# Patient Record
Sex: MALE | Race: WHITE | Employment: FULL TIME | URBAN - METROPOLITAN AREA
[De-identification: names, ages, dates, MRNs, and addresses within clinical notes are randomized per-mention and may not be internally consistent; named-entity substitution may affect disease eponyms.]

---

## 2017-08-02 ENCOUNTER — DOCUMENTATION ONLY (OUTPATIENT)
Dept: LAB | Facility: CLINIC | Age: 57
End: 2017-08-02

## 2017-08-02 DIAGNOSIS — Z80.8 FAMILY HISTORY OF THYROID CANCER: ICD-10-CM

## 2017-08-02 DIAGNOSIS — Z80.42 FAMILY HX OF PROSTATE CANCER: ICD-10-CM

## 2017-08-02 DIAGNOSIS — Z00.00 ROUTINE GENERAL MEDICAL EXAMINATION AT A HEALTH CARE FACILITY: ICD-10-CM

## 2017-08-02 DIAGNOSIS — E03.9 ACQUIRED HYPOTHYROIDISM: ICD-10-CM

## 2017-08-02 DIAGNOSIS — Z13.6 CARDIOVASCULAR SCREENING; LDL GOAL LESS THAN 160: Primary | ICD-10-CM

## 2017-08-02 NOTE — PROGRESS NOTES
Pt is coming for PV physical lab on 8/9/17. I have pended future orders. Please review, associate diagnosis, and sign pended order.     Thanks, Lab

## 2017-08-09 DIAGNOSIS — Z13.6 CARDIOVASCULAR SCREENING; LDL GOAL LESS THAN 160: ICD-10-CM

## 2017-08-09 DIAGNOSIS — Z80.8 FAMILY HISTORY OF THYROID CANCER: ICD-10-CM

## 2017-08-09 DIAGNOSIS — E03.9 ACQUIRED HYPOTHYROIDISM: ICD-10-CM

## 2017-08-09 DIAGNOSIS — Z00.00 ROUTINE GENERAL MEDICAL EXAMINATION AT A HEALTH CARE FACILITY: ICD-10-CM

## 2017-08-09 DIAGNOSIS — Z80.42 FAMILY HX OF PROSTATE CANCER: ICD-10-CM

## 2017-08-09 LAB
ANION GAP SERPL CALCULATED.3IONS-SCNC: 4 MMOL/L (ref 3–14)
BUN SERPL-MCNC: 18 MG/DL (ref 7–30)
CALCIUM SERPL-MCNC: 8.6 MG/DL (ref 8.5–10.1)
CHLORIDE SERPL-SCNC: 105 MMOL/L (ref 94–109)
CHOLEST SERPL-MCNC: 180 MG/DL
CO2 SERPL-SCNC: 31 MMOL/L (ref 20–32)
CREAT SERPL-MCNC: 1.22 MG/DL (ref 0.66–1.25)
GFR SERPL CREATININE-BSD FRML MDRD: 61 ML/MIN/1.7M2
GLUCOSE SERPL-MCNC: 90 MG/DL (ref 70–99)
HDLC SERPL-MCNC: 66 MG/DL
LDLC SERPL CALC-MCNC: 100 MG/DL
NONHDLC SERPL-MCNC: 114 MG/DL
POTASSIUM SERPL-SCNC: 3.8 MMOL/L (ref 3.4–5.3)
PSA SERPL-ACNC: 0.5 UG/L (ref 0–4)
SODIUM SERPL-SCNC: 140 MMOL/L (ref 133–144)
TRIGL SERPL-MCNC: 69 MG/DL
TSH SERPL DL<=0.005 MIU/L-ACNC: 6.6 MU/L (ref 0.4–4)

## 2017-08-09 PROCEDURE — 84443 ASSAY THYROID STIM HORMONE: CPT | Performed by: INTERNAL MEDICINE

## 2017-08-09 PROCEDURE — 80061 LIPID PANEL: CPT | Performed by: INTERNAL MEDICINE

## 2017-08-09 PROCEDURE — 36415 COLL VENOUS BLD VENIPUNCTURE: CPT | Performed by: INTERNAL MEDICINE

## 2017-08-09 PROCEDURE — 80048 BASIC METABOLIC PNL TOTAL CA: CPT | Performed by: INTERNAL MEDICINE

## 2017-08-09 PROCEDURE — G0103 PSA SCREENING: HCPCS | Performed by: INTERNAL MEDICINE

## 2017-08-17 ENCOUNTER — OFFICE VISIT (OUTPATIENT)
Dept: FAMILY MEDICINE | Facility: CLINIC | Age: 57
End: 2017-08-17
Payer: COMMERCIAL

## 2017-08-17 VITALS
DIASTOLIC BLOOD PRESSURE: 78 MMHG | HEART RATE: 63 BPM | BODY MASS INDEX: 21.39 KG/M2 | HEIGHT: 75 IN | SYSTOLIC BLOOD PRESSURE: 116 MMHG | TEMPERATURE: 97.2 F | WEIGHT: 172 LBS | OXYGEN SATURATION: 99 %

## 2017-08-17 DIAGNOSIS — Z00.00 ROUTINE GENERAL MEDICAL EXAMINATION AT A HEALTH CARE FACILITY: Primary | ICD-10-CM

## 2017-08-17 DIAGNOSIS — Z80.8 FAMILY HISTORY OF THYROID CANCER: ICD-10-CM

## 2017-08-17 DIAGNOSIS — G44.209 TENSION HEADACHE: ICD-10-CM

## 2017-08-17 DIAGNOSIS — L65.9 ALOPECIA: ICD-10-CM

## 2017-08-17 DIAGNOSIS — Z80.42 FAMILY HX OF PROSTATE CANCER: ICD-10-CM

## 2017-08-17 DIAGNOSIS — E03.9 ACQUIRED HYPOTHYROIDISM: ICD-10-CM

## 2017-08-17 DIAGNOSIS — Z23 NEED FOR PROPHYLACTIC VACCINATION WITH TETANUS-DIPHTHERIA (TD): ICD-10-CM

## 2017-08-17 PROCEDURE — 99396 PREV VISIT EST AGE 40-64: CPT | Mod: 25 | Performed by: INTERNAL MEDICINE

## 2017-08-17 PROCEDURE — 90471 IMMUNIZATION ADMIN: CPT | Performed by: INTERNAL MEDICINE

## 2017-08-17 PROCEDURE — 90714 TD VACC NO PRESV 7 YRS+ IM: CPT | Performed by: INTERNAL MEDICINE

## 2017-08-17 RX ORDER — FINASTERIDE 1 MG/1
1 TABLET, FILM COATED ORAL DAILY
Qty: 100 TABLET | Refills: 3 | Status: SHIPPED | OUTPATIENT
Start: 2017-08-17 | End: 2018-08-09

## 2017-08-17 RX ORDER — AMITRIPTYLINE HYDROCHLORIDE 10 MG/1
10 TABLET ORAL
Qty: 90 TABLET | Refills: 0 | Status: SHIPPED | OUTPATIENT
Start: 2017-08-17 | End: 2018-02-01

## 2017-08-17 NOTE — PROGRESS NOTES
SUBJECTIVE:   CC: Chano Vaz is an 56 year old male who presents for preventative health visit.     Healthy Habits: Answers for HPI/ROS submitted by the patient on 8/15/2017   Annual Exam:  Getting at least 3 servings of Calcium per day:: Yes  Bi-annual eye exam:: Yes  Dental care twice a year:: Yes  Sleep apnea or symptoms of sleep apnea:: None  Diet:: Regular (no restrictions)  Frequency of exercise:: 4-5 days/week  Taking medications regularly:: Yes  Medication side effects:: None  Additional concerns today:: No  PHQ-2 Score: 0  Duration of exercise:: 45-60 minutes      Patient is very healthy and has no issues today      Today's PHQ-2 Score:   PHQ-2 ( 1999 [employer]) 8/15/2017 8/12/2016   Q1: Little interest or pleasure in doing things 0 0   Q2: Feeling down, depressed or hopeless 0 0   PHQ-2 Score 0 0   Q1: Little interest or pleasure in doing things Not at all -   Q2: Feeling down, depressed or hopeless Not at all -   PHQ-2 Score 0 -         Abuse: Current or Past(Physical, Sexual or Emotional)- No  Do you feel safe in your environment - Yes  Social History   Substance Use Topics     Smoking status: Never Smoker     Smokeless tobacco: Never Used     Alcohol use 1.5 - 2.5 oz/week     3 - 5 Standard drinks or equivalent per week      Comment: social, twice a week     The patient does not drink >3 drinks per day nor >7 drinks per week.    Last PSA:   PSA   Date Value Ref Range Status   08/09/2017 0.50 0 - 4 ug/L Final     Comment:     Assay Method:  Chemiluminescence using Siemens Vista analyzer       Reviewed orders with patient. Reviewed health maintenance and updated orders accordingly - Yes  Patient Active Problem List   Diagnosis     Internal hemorrhoids with other complication     CARDIOVASCULAR SCREENING; LDL GOAL LESS THAN 160     Left hip pain     Family hx of prostate cancer     Family history of thyroid cancer     Knee pain     Hypothyroidism     Advanced directives, counseling/discussion      Past Surgical History:   Procedure Laterality Date     COLONOSCOPY  2007       Social History   Substance Use Topics     Smoking status: Never Smoker     Smokeless tobacco: Never Used     Alcohol use 1.5 - 2.5 oz/week     3 - 5 Standard drinks or equivalent per week      Comment: social, twice a week     Family History   Problem Relation Age of Onset     Prostate Cancer Maternal Grandfather      Hypertension Father      Arthritis Mother      SLE     Thyroid Disease Father      CANCER Daughter 18     thyroid         Current Outpatient Prescriptions   Medication Sig Dispense Refill     finasteride (PROPECIA) 1 MG tablet Take 1 tablet (1 mg) by mouth daily 100 tablet 3     amitriptyline (ELAVIL) 10 MG tablet Take 1 tablet (10 mg) by mouth nightly as needed for sleep 90 tablet 0     amitriptyline (ELAVIL) 10 MG tablet 10 mg daily (Patient taking differently: Take 5 mg by mouth At Bedtime 10 mg daily) 90 tablet 3     Ascorbic Acid (VITAMIN C PO) Take 1 tablet by mouth daily        FISH  MG OR CAPS 2 caps a day       ASPIRIN 81 MG OR TABS ONE DAILY 100 3     Allergies   Allergen Reactions     No Known Allergies      Orders Only on 08/09/2017   Component Date Value Ref Range Status     TSH 08/09/2017 6.60* 0.40 - 4.00 mU/L Final     Sodium 08/09/2017 140  133 - 144 mmol/L Final     Potassium 08/09/2017 3.8  3.4 - 5.3 mmol/L Final     Chloride 08/09/2017 105  94 - 109 mmol/L Final     Carbon Dioxide 08/09/2017 31  20 - 32 mmol/L Final     Anion Gap 08/09/2017 4  3 - 14 mmol/L Final     Glucose 08/09/2017 90  70 - 99 mg/dL Final     Urea Nitrogen 08/09/2017 18  7 - 30 mg/dL Final     Creatinine 08/09/2017 1.22  0.66 - 1.25 mg/dL Final     GFR Estimate 08/09/2017 61  >60 mL/min/1.7m2 Final    Non  GFR Calc     GFR Estimate If Black 08/09/2017 74  >60 mL/min/1.7m2 Final    African American GFR Calc     Calcium 08/09/2017 8.6  8.5 - 10.1 mg/dL Final     PSA 08/09/2017 0.50  0 - 4 ug/L Final    Assay  "Method:  Chemiluminescence using Siemens Vista analyzer     Cholesterol 08/09/2017 180  <200 mg/dL Final     Triglycerides 08/09/2017 69  <150 mg/dL Final     HDL Cholesterol 08/09/2017 66  >39 mg/dL Final     LDL Cholesterol Calculated 08/09/2017 100* <100 mg/dL Final    Comment: Above desirable:  100-129 mg/dl   Borderline High:  130-159 mg/dL   High:             160-189 mg/dL   Very high:       >189 mg/dl       Non HDL Cholesterol 08/09/2017 114  <130 mg/dL Final       Reviewed and updated as needed this visit by clinical staff  Tobacco  Allergies  Meds  Soc Hx        Reviewed and updated as needed this visit by Provider  Meds            ROS:  10 point ROS of systems including Constitutional, Eyes, Respiratory, Cardiovascular, Gastroenterology, Genitourinary, Integumentary, Muscularskeletal, Psychiatric were all negative except for pertinent positives noted in my HPI.    OBJECTIVE:   /78 (BP Location: Left arm, Patient Position: Chair, Cuff Size: Adult Regular)  Pulse 63  Temp 97.2  F (36.2  C) (Tympanic)  Ht 6' 3\" (1.905 m)  Wt 172 lb (78 kg)  SpO2 99%  BMI 21.5 kg/m2  EXAM:  GENERAL: healthy, alert and no distress  EYES: Eyes grossly normal to inspection, PERRL and conjunctivae and sclerae normal  HENT: ear canals and TM's normal, nose and mouth without ulcers or lesions  NECK: no adenopathy, no asymmetry, masses, or scars and thyroid normal to palpation  RESP: lungs clear to auscultation - no rales, rhonchi or wheezes  CV: regular rate and rhythm, normal S1 S2, no S3 or S4, no murmur, click or rub, no peripheral edema and peripheral pulses strong  ABDOMEN: soft, nontender, no hepatosplenomegaly, no masses and bowel sounds normal   (male): normal male genitalia without lesions or urethral discharge, no hernia  RECTAL: normal sphincter tone, no rectal masses, prostate normal size, smooth, nontender without nodules or masses  MS: no gross musculoskeletal defects noted, no edema  SKIN: no " suspicious lesions or rashes  NEURO: Normal strength and tone, mentation intact and speech normal  PSYCH: mentation appears normal, affect normal/bright  Orders Only on 08/09/2017   Component Date Value Ref Range Status     TSH 08/09/2017 6.60* 0.40 - 4.00 mU/L Final     Sodium 08/09/2017 140  133 - 144 mmol/L Final     Potassium 08/09/2017 3.8  3.4 - 5.3 mmol/L Final     Chloride 08/09/2017 105  94 - 109 mmol/L Final     Carbon Dioxide 08/09/2017 31  20 - 32 mmol/L Final     Anion Gap 08/09/2017 4  3 - 14 mmol/L Final     Glucose 08/09/2017 90  70 - 99 mg/dL Final     Urea Nitrogen 08/09/2017 18  7 - 30 mg/dL Final     Creatinine 08/09/2017 1.22  0.66 - 1.25 mg/dL Final     GFR Estimate 08/09/2017 61  >60 mL/min/1.7m2 Final    Non  GFR Calc     GFR Estimate If Black 08/09/2017 74  >60 mL/min/1.7m2 Final    African American GFR Calc     Calcium 08/09/2017 8.6  8.5 - 10.1 mg/dL Final     PSA 08/09/2017 0.50  0 - 4 ug/L Final    Assay Method:  Chemiluminescence using Siemens Vista analyzer     Cholesterol 08/09/2017 180  <200 mg/dL Final     Triglycerides 08/09/2017 69  <150 mg/dL Final     HDL Cholesterol 08/09/2017 66  >39 mg/dL Final     LDL Cholesterol Calculated 08/09/2017 100* <100 mg/dL Final    Comment: Above desirable:  100-129 mg/dl   Borderline High:  130-159 mg/dL   High:             160-189 mg/dL   Very high:       >189 mg/dl       Non HDL Cholesterol 08/09/2017 114  <130 mg/dL Final       ASSESSMENT/PLAN:   1. Routine general medical examination at a health care facility  Dt today  uptodate on immunization otherwise      2. Acquired hypothyroidism  Subclinical still  - **TSH with free T4 reflex FUTURE 2mo; Future    3. Tension headache  Very small dose   - amitriptyline (ELAVIL) 10 MG tablet; Take 1 tablet (10 mg) by mouth nightly as needed for sleep  Dispense: 90 tablet; Refill: 0    4. Family hx of prostate cancer  No symptoms     5. Family history of thyroid cancer  As above  Normal  "exam    6. Alopecia    - finasteride (PROPECIA) 1 MG tablet; Take 1 tablet (1 mg) by mouth daily  Dispense: 100 tablet; Refill: 3    7. Need for prophylactic vaccination with tetanus-diphtheria (TD)    - TD PRSERV FREE >=7 YRS ADS IM [39346]  - 1st  Administration  [24242]    COUNSELING:  Reviewed preventive health counseling, as reflected in patient instructions         reports that he has never smoked. He has never used smokeless tobacco.      Estimated body mass index is 21.5 kg/(m^2) as calculated from the following:    Height as of this encounter: 6' 3\" (1.905 m).    Weight as of this encounter: 172 lb (78 kg).         Counseling Resources:  ATP IV Guidelines  Pooled Cohorts Equation Calculator  FRAX Risk Assessment  ICSI Preventive Guidelines  Dietary Guidelines for Americans, 2010  USDA's MyPlate  ASA Prophylaxis  Lung CA Screening    Zenobia Ramirez MD  [facility]  "

## 2017-08-17 NOTE — NURSING NOTE
"Chief Complaint   Patient presents with     Physical       Initial /78 (BP Location: Left arm, Patient Position: Chair, Cuff Size: Adult Regular)  Pulse 63  Temp 97.2  F (36.2  C) (Tympanic)  Ht 6' 3\" (1.905 m)  Wt 172 lb (78 kg)  SpO2 99%  BMI 21.5 kg/m2 Estimated body mass index is 21.5 kg/(m^2) as calculated from the following:    Height as of this encounter: 6' 3\" (1.905 m).    Weight as of this encounter: 172 lb (78 kg).  Medication Reconciliation: complete  "

## 2017-08-17 NOTE — MR AVS SNAPSHOT
After Visit Summary   8/17/2017    Chano Vaz    MRN: [id number]           Patient Information     Date Of Birth          1960        Visit Information        Provider Department      8/17/2017 8:30 AM Zenobia Ramirez MD [facility]        Today's Diagnoses     Routine general medical examination at a health care facility    -  1    Acquired hypothyroidism        Tension headache        Family hx of prostate cancer        Family history of thyroid cancer        Alopecia        Need for prophylactic vaccination with tetanus-diphtheria (TD)          Care Instructions      Preventive Health Recommendations  Male Ages 50 - 64    Yearly exam:             See your health care provider every year in order to  o   Review health changes.   o   Discuss preventive care.    o   Review your medicines if your doctor has prescribed any.     Have a cholesterol test every 5 years, or more frequently if you are at risk for high cholesterol/heart disease.     Have a diabetes test (fasting glucose) every three years. If you are at risk for diabetes, you should have this test more often.     Have a colonoscopy at age 50, or have a yearly FIT test (stool test). These exams will check for colon cancer.      Talk with your health care provider about whether or not a prostate cancer screening test (PSA) is right for you.    You should be tested each year for STDs (sexually transmitted diseases), if you re at risk.     Shots: Get a flu shot each year. Get a tetanus shot every 10 years.     Nutrition:    Eat at least 5 servings of fruits and vegetables daily.     Eat whole-grain bread, whole-wheat pasta and brown rice instead of white grains and rice.     Talk to your provider about Calcium and Vitamin D.     Lifestyle    Exercise for at least 150 minutes a week (30 minutes a day, 5 days a week). This will help you control your weight and prevent disease.     Limit alcohol to one drink per day.     No  "smoking.     Wear sunscreen to prevent skin cancer.     See your dentist every six months for an exam and cleaning.     See your eye doctor every 1 to 2 years.    Turmeric/Curcumin 500 mg daily            Follow-ups after your visit        Future tests that were ordered for you today     Open Future Orders        Priority Expected Expires Ordered    **TSH with free T4 reflex FUTURE 2mo Routine 10/16/2017 12/15/2017 8/17/2017            Who to contact     If you have questions or need follow up information about today's clinic visit or your schedule please contact [facility] directly at [phone].  Normal or non-critical lab and imaging results will be communicated to you by [employer]Medics[employer], letter or phone within 4 business days after the clinic has received the results. If you do not hear from us within 7 days, please contact the clinic through [employer]citet or phone. If you have a critical or abnormal lab result, we will notify you by phone as soon as possible.  Submit refill requests through [employer]-Semble or call your pharmacy and they will forward the refill request to us. Please allow 3 business days for your refill to be completed.          Additional Information About Your Visit        MyChart Information     [employer] gives you secure access to your electronic health record. If you see a primary care provider, you can also send messages to your care team and make appointments. If you have questions, please call your primary care clinic.  If you do not have a primary care provider, please call [phone] and they will assist you.        Care EveryWhere ID     This is your Care EveryWhere ID. This could be used by other organizations to access your [facility] medical records  [id number]        Your Vitals Were     Pulse Temperature Height Pulse Oximetry BMI (Body Mass Index)       63 97.2  F (36.2  C) (Tympanic) 6' 3\" (1.905 m) 99% 21.5 kg/m2        Blood Pressure from Last 3 Encounters:   08/17/17 116/78 "   08/12/16 129/87   07/20/15 119/77    Weight from Last 3 Encounters:   08/17/17 172 lb (78 kg)   08/12/16 170 lb 4 oz (77.2 kg)   07/20/15 172 lb (78 kg)              We Performed the Following     1st  Administration  [29567]     TD PRSERV FREE >=7 YRS ADS IM [[id number]]          Today's Medication Changes          These changes are accurate as of: 8/17/17  8:46 AM.  If you have any questions, ask your nurse or doctor.               These medicines have changed or have updated prescriptions.        Dose/Directions    * amitriptyline 10 MG tablet   Commonly known as:  ELAVIL   This may have changed:    - how much to take  - how to take this  - when to take this  - additional instructions   Used for:  Chronic pain of right knee   Changed by:  Zenobia Ramirez MD        10 mg daily   Quantity:  90 tablet   Refills:  3       * amitriptyline 10 MG tablet   Commonly known as:  ELAVIL   This may have changed:  You were already taking a medication with the same name, and this prescription was added. Make sure you understand how and when to take each.   Used for:  Tension headache   Changed by:  Zenobia Ramirez MD        Dose:  10 mg   Take 1 tablet (10 mg) by mouth nightly as needed for sleep   Quantity:  90 tablet   Refills:  0       finasteride 1 MG tablet   Commonly known as:  PROPECIA   This may have changed:  See the new instructions.   Used for:  Alopecia   Changed by:  Zenobia Ramirez MD        Dose:  1 tablet   Take 1 tablet (1 mg) by mouth daily   Quantity:  100 tablet   Refills:  3       * Notice:  This list has 2 medication(s) that are the same as other medications prescribed for you. Read the directions carefully, and ask your doctor or other care provider to review them with you.         Where to get your medicines      These medications were sent to [facility] [address] [facility] - [place] - [address]  [address] [address]     Phone:  [phone]     amitriptyline 10 MG tablet         Some of these will  need a paper prescription and others can be bought over the counter.  Ask your nurse if you have questions.     Bring a paper prescription for each of these medications     finasteride 1 MG tablet                Primary Care Provider Office Phone # Fax #    Zenobia Ramirez [phone] [address]  [facility] [address]        Equal Access to Services     [facility]: Hadii aad ku hadasho Soomaali, waaxda luqadaha, qaybta kaalmada adeegyada, waxay idiin hayaan adeeg kharash la'aan ah. So [facility] [phone].    ATENCIÓN: Si habla español, tiene a miranda disposición servicios gratuitos de asistencia lingüística. Llame al [phone].    We comply with applicable federal civil rights laws and [place] laws. We do not discriminate on the basis of race, color, national origin, age, disability sex, sexual orientation or gender identity.            Thank you!     Thank you for choosing [facility]  for your care. Our goal is always to provide you with excellent care. Hearing back from our patients is one way we can continue to improve our services. Please take a few minutes to complete the written survey that you may receive in the mail after your visit with us. Thank you!             Your Updated Medication List - Protect others around you: Learn how to safely use, store and throw away your medicines at www.disposemymeds.org.          This list is accurate as of: 8/17/17  8:46 AM.  Always use your most recent med list.                   Brand Name Dispense Instructions for use Diagnosis    * amitriptyline 10 MG tablet    ELAVIL    90 tablet    10 mg daily    Chronic pain of right knee       * amitriptyline 10 MG tablet    ELAVIL    90 tablet    Take 1 tablet (10 mg) by mouth nightly as needed for sleep    Tension headache       aspirin 81 MG tablet     100    ONE DAILY    Routine general medical examination at a health care facility       finasteride 1 MG tablet    PROPECIA    100  tablet    Take 1 tablet (1 mg) by mouth daily    Alopecia       Fish Oil 500 MG Caps      2 caps a day        VITAMIN C PO      Take 1 tablet by mouth daily        * Notice:  This list has 2 medication(s) that are the same as other medications prescribed for you. Read the directions carefully, and ask your doctor or other care provider to review them with you.

## 2017-08-17 NOTE — PATIENT INSTRUCTIONS
Preventive Health Recommendations  Male Ages 50 - 64    Yearly exam:             See your health care provider every year in order to  o   Review health changes.   o   Discuss preventive care.    o   Review your medicines if your doctor has prescribed any.     Have a cholesterol test every 5 years, or more frequently if you are at risk for high cholesterol/heart disease.     Have a diabetes test (fasting glucose) every three years. If you are at risk for diabetes, you should have this test more often.     Have a colonoscopy at age 50, or have a yearly FIT test (stool test). These exams will check for colon cancer.      Talk with your health care provider about whether or not a prostate cancer screening test (PSA) is right for you.    You should be tested each year for STDs (sexually transmitted diseases), if you re at risk.     Shots: Get a flu shot each year. Get a tetanus shot every 10 years.     Nutrition:    Eat at least 5 servings of fruits and vegetables daily.     Eat whole-grain bread, whole-wheat pasta and brown rice instead of white grains and rice.     Talk to your provider about Calcium and Vitamin D.     Lifestyle    Exercise for at least 150 minutes a week (30 minutes a day, 5 days a week). This will help you control your weight and prevent disease.     Limit alcohol to one drink per day.     No smoking.     Wear sunscreen to prevent skin cancer.     See your dentist every six months for an exam and cleaning.     See your eye doctor every 1 to 2 years.    Turmeric/Curcumin 500 mg daily

## 2017-08-17 NOTE — NURSING NOTE
Screening Questionnaire for Adult Immunization    Are you sick today?   No   Do you have allergies to medications, food, a vaccine component or latex?   No   Have you ever had a serious reaction after receiving a vaccination?   No   Do you have a long-term health problem with heart disease, lung disease, asthma, kidney disease, metabolic disease (e.g. diabetes), anemia, or other blood disorder?   No   Do you have cancer, leukemia, HIV/AIDS, or any other immune system problem?   No   In the past 3 months, have you taken medications that affect  your immune system, such as prednisone, other steroids, or anticancer drugs; drugs for the treatment of rheumatoid arthritis, Crohn s disease, or psoriasis; or have you had radiation treatments?   No   Have you had a seizure, or a brain or other nervous system problem?   No   During the past year, have you received a transfusion of blood or blood     products, or been given immune (gamma) globulin or antiviral drug?   No   For women: Are you pregnant or is there a chance you could become        pregnant during the next month?   No   Have you received any vaccinations in the past 4 weeks?   No     Immunization questionnaire answers were all negative.        Per orders of Dr. Ramirez, injection of TD given by Margo Estevez. Patient instructed to remain in clinic for 15 minutes afterwards, and to report any adverse reaction to me immediately.     Prior to injection verified patient identity using patient's name and date of birth.  Screening performed by Margo Estevez on 8/17/2017 at 9:13 AM.  [facility]

## 2017-10-24 DIAGNOSIS — E03.9 ACQUIRED HYPOTHYROIDISM: ICD-10-CM

## 2017-10-24 LAB
T4 FREE SERPL-MCNC: 1.08 NG/DL (ref 0.76–1.46)
TSH SERPL DL<=0.005 MIU/L-ACNC: 4.1 MU/L (ref 0.4–4)

## 2017-10-24 PROCEDURE — 84439 ASSAY OF FREE THYROXINE: CPT | Performed by: INTERNAL MEDICINE

## 2017-10-24 PROCEDURE — 84443 ASSAY THYROID STIM HORMONE: CPT | Performed by: INTERNAL MEDICINE

## 2017-10-24 PROCEDURE — 36415 COLL VENOUS BLD VENIPUNCTURE: CPT | Performed by: INTERNAL MEDICINE

## 2018-02-01 DIAGNOSIS — G44.209 TENSION HEADACHE: ICD-10-CM

## 2018-02-01 NOTE — TELEPHONE ENCOUNTER
"amitriptyline (ELAVIL) 10 MG tablet 90 tablet 0 8/17/2017         Last Written Prescription Date:  08/17/2017  Last Fill Quantity: 90,  # refills: 0   Last Office Visit with [facility] provider:  08/17/2017   Future Office Visit:   unknown  Requested Prescriptions   Pending Prescriptions Disp Refills     amitriptyline (ELAVIL) 10 MG tablet [Pharmacy Med Name: AMITRIPTYLINE HCL 10 MG TAB] 90 tablet 1     Sig: TAKE 1 TABLET BY MOUTH EVERY DAY    Tricyclic Antidepressants Protocol Passed    2/1/2018  1:39 PM       Passed - Blood pressure under 140/90    BP Readings from Last 3 Encounters:   08/17/17 116/78   08/12/16 129/87   07/20/15 119/77                Passed - Recent (12 mo) or future (30 d) visit with authorizing provider's specialty     Patient had office visit in the last year or has a visit in the next 30 days with authorizing provider.  See \"Patient Info\" tab in inbasket, or \"Choose Columns\" in Meds & Orders section of the refill encounter.            Passed - Patient is age 18 or older          "

## 2018-02-02 RX ORDER — AMITRIPTYLINE HYDROCHLORIDE 10 MG/1
TABLET ORAL
Qty: 90 TABLET | Refills: 1 | Status: SHIPPED | OUTPATIENT
Start: 2018-02-02 | End: 2018-07-29

## 2018-02-02 NOTE — TELEPHONE ENCOUNTER
Prescription approved per [facility] Refill Protocol.  Shahida Millard RN

## 2018-05-15 ENCOUNTER — MYC MEDICAL ADVICE (OUTPATIENT)
Dept: FAMILY MEDICINE | Facility: CLINIC | Age: 58
End: 2018-05-15

## 2018-05-15 DIAGNOSIS — L23.7 CONTACT DERMATITIS DUE TO POISON IVY: Primary | ICD-10-CM

## 2018-05-15 RX ORDER — TRIAMCINOLONE ACETONIDE 1 MG/G
CREAM TOPICAL
Qty: 30 G | Refills: 1 | Status: SHIPPED | OUTPATIENT
Start: 2018-05-15 | End: 2018-05-15

## 2018-05-15 RX ORDER — TRIAMCINOLONE ACETONIDE 1 MG/G
CREAM TOPICAL
Qty: 30 G | Refills: 1 | Status: SHIPPED | OUTPATIENT
Start: 2018-05-15 | End: 2018-08-09

## 2018-05-15 NOTE — TELEPHONE ENCOUNTER
Patient called regarding this prescription.  He is traveling, and needs the prescription sent to:    [facility]  [address]    Patient can be reached at:  [phone]  Ok to leave a detailed message at this number.

## 2018-05-16 ENCOUNTER — TELEPHONE (OUTPATIENT)
Dept: FAMILY MEDICINE | Facility: CLINIC | Age: 58
End: 2018-05-16

## 2018-05-16 DIAGNOSIS — Z13.6 CARDIOVASCULAR SCREENING; LDL GOAL LESS THAN 100: ICD-10-CM

## 2018-05-16 DIAGNOSIS — E03.9 ACQUIRED HYPOTHYROIDISM: ICD-10-CM

## 2018-05-16 DIAGNOSIS — M25.552 LEFT HIP PAIN: ICD-10-CM

## 2018-05-16 DIAGNOSIS — Z80.8 FAMILY HISTORY OF THYROID CANCER: Primary | ICD-10-CM

## 2018-05-16 DIAGNOSIS — Z80.42 FAMILY HX OF PROSTATE CANCER: ICD-10-CM

## 2018-05-16 NOTE — TELEPHONE ENCOUNTER
To PCP:     Pt would like fasting physical labs prior to appt. Can these be ordered as future?     Thank you,   Michelle JNAG RN

## 2018-05-16 NOTE — TELEPHONE ENCOUNTER
Reason for Call: Request for an order or referral:    Order or referral being requested: Fasting Labs     Date needed: before my next appointment    Has the patient been seen by the PCP for this problem? YES    Additional comments: Pt has px scheduled for 8/9/18, would like to do blood work before to discuss at appointment time. Please order these. And call patient to let him know so he can schedule     Phone number Patient can be reached at:  Home number on file [phone] (home)    Best Time:  Anytime     Can we leave a detailed message on this number?  YES    Call taken on 5/16/2018 at 4:20 PM by Ayana Vinson

## 2018-05-17 PROBLEM — Z13.6 CARDIOVASCULAR SCREENING; LDL GOAL LESS THAN 100: Status: ACTIVE | Noted: 2018-05-17

## 2018-07-29 DIAGNOSIS — G44.209 TENSION HEADACHE: ICD-10-CM

## 2018-07-30 NOTE — TELEPHONE ENCOUNTER
"Last Written Prescription Date:  2/2/18  Last Fill Quantity: 90,  # refills: 1   Last office visit: 8/17/2017 with prescribing provider:     Future Office Visit:   Next 5 appointments (look out 90 days)     Aug 09, 2018  9:30 AM CDT   PHYSICAL with Zenobia Ramirez MD   [facility] ([facility])    [address] [facility] [id number] [phone]                 Requested Prescriptions   Pending Prescriptions Disp Refills     amitriptyline (ELAVIL) 10 MG tablet [Pharmacy Med Name: AMITRIPTYLINE HCL 10 MG TAB] 90 tablet 1     Sig: TAKE 1 TABLET BY MOUTH EVERY DAY    Tricyclic Agents ( Annual appt and no PHQ9) Passed    7/29/2018  1:29 AM       Passed - Blood Pressure under 140/90 in past 12 mos    BP Readings from Last 3 Encounters:   08/17/17 116/78   08/12/16 129/87   07/20/15 119/77                Passed - Recent (12 mo) or future (30 days) visit within authorizing provider's specialty    Patient had office visit in the last 12 months or has a visit in the next 30 days with authorizing provider or within the authorizing provider's specialty.  See \"Patient Info\" tab in inbasket, or \"Choose Columns\" in Meds & Orders section of the refill encounter.           Passed - Patient is age 18 or older          "

## 2018-07-31 RX ORDER — AMITRIPTYLINE HYDROCHLORIDE 10 MG/1
TABLET ORAL
Qty: 90 TABLET | Refills: 0 | Status: SHIPPED | OUTPATIENT
Start: 2018-07-31 | End: 2018-08-09

## 2018-08-07 DIAGNOSIS — Z13.6 CARDIOVASCULAR SCREENING; LDL GOAL LESS THAN 100: ICD-10-CM

## 2018-08-07 DIAGNOSIS — E03.9 ACQUIRED HYPOTHYROIDISM: ICD-10-CM

## 2018-08-07 DIAGNOSIS — M25.552 LEFT HIP PAIN: ICD-10-CM

## 2018-08-07 DIAGNOSIS — Z80.8 FAMILY HISTORY OF THYROID CANCER: ICD-10-CM

## 2018-08-07 DIAGNOSIS — Z80.42 FAMILY HX OF PROSTATE CANCER: ICD-10-CM

## 2018-08-07 LAB
ALBUMIN SERPL-MCNC: 3.7 G/DL (ref 3.4–5)
ALP SERPL-CCNC: 92 U/L (ref 40–150)
ALT SERPL W P-5'-P-CCNC: 23 U/L (ref 0–70)
ANION GAP SERPL CALCULATED.3IONS-SCNC: 7 MMOL/L (ref 3–14)
AST SERPL W P-5'-P-CCNC: 17 U/L (ref 0–45)
BILIRUB SERPL-MCNC: 2.1 MG/DL (ref 0.2–1.3)
BUN SERPL-MCNC: 18 MG/DL (ref 7–30)
CALCIUM SERPL-MCNC: 8.4 MG/DL (ref 8.5–10.1)
CHLORIDE SERPL-SCNC: 104 MMOL/L (ref 94–109)
CHOLEST SERPL-MCNC: 223 MG/DL
CO2 SERPL-SCNC: 29 MMOL/L (ref 20–32)
CREAT SERPL-MCNC: 1.03 MG/DL (ref 0.66–1.25)
GFR SERPL CREATININE-BSD FRML MDRD: 74 ML/MIN/1.7M2
GLUCOSE SERPL-MCNC: 88 MG/DL (ref 70–99)
HDLC SERPL-MCNC: 57 MG/DL
LDLC SERPL CALC-MCNC: 140 MG/DL
NONHDLC SERPL-MCNC: 166 MG/DL
POTASSIUM SERPL-SCNC: 4 MMOL/L (ref 3.4–5.3)
PROT SERPL-MCNC: 7.4 G/DL (ref 6.8–8.8)
PSA SERPL-ACNC: 0.36 UG/L (ref 0–4)
SODIUM SERPL-SCNC: 140 MMOL/L (ref 133–144)
T4 FREE SERPL-MCNC: 0.96 NG/DL (ref 0.76–1.46)
TRIGL SERPL-MCNC: 128 MG/DL
TSH SERPL DL<=0.005 MIU/L-ACNC: 4.46 MU/L (ref 0.4–4)

## 2018-08-07 PROCEDURE — 80061 LIPID PANEL: CPT | Performed by: INTERNAL MEDICINE

## 2018-08-07 PROCEDURE — G0103 PSA SCREENING: HCPCS | Performed by: INTERNAL MEDICINE

## 2018-08-07 PROCEDURE — 36415 COLL VENOUS BLD VENIPUNCTURE: CPT | Performed by: INTERNAL MEDICINE

## 2018-08-07 PROCEDURE — 84439 ASSAY OF FREE THYROXINE: CPT | Performed by: INTERNAL MEDICINE

## 2018-08-07 PROCEDURE — 84443 ASSAY THYROID STIM HORMONE: CPT | Performed by: INTERNAL MEDICINE

## 2018-08-07 PROCEDURE — 80053 COMPREHEN METABOLIC PANEL: CPT | Performed by: INTERNAL MEDICINE

## 2018-08-09 ENCOUNTER — OFFICE VISIT (OUTPATIENT)
Dept: FAMILY MEDICINE | Facility: CLINIC | Age: 58
End: 2018-08-09
Payer: COMMERCIAL

## 2018-08-09 VITALS
BODY MASS INDEX: 21.13 KG/M2 | WEIGHT: 169.9 LBS | TEMPERATURE: 97.6 F | HEART RATE: 76 BPM | SYSTOLIC BLOOD PRESSURE: 121 MMHG | OXYGEN SATURATION: 96 % | HEIGHT: 75 IN | DIASTOLIC BLOOD PRESSURE: 70 MMHG

## 2018-08-09 DIAGNOSIS — G44.209 TENSION HEADACHE: ICD-10-CM

## 2018-08-09 DIAGNOSIS — Z00.00 ROUTINE GENERAL MEDICAL EXAMINATION AT A HEALTH CARE FACILITY: Primary | ICD-10-CM

## 2018-08-09 DIAGNOSIS — Z80.42 FAMILY HX OF PROSTATE CANCER: ICD-10-CM

## 2018-08-09 DIAGNOSIS — E80.4 GILBERT SYNDROME: ICD-10-CM

## 2018-08-09 DIAGNOSIS — E03.9 ACQUIRED HYPOTHYROIDISM: ICD-10-CM

## 2018-08-09 DIAGNOSIS — L65.9 ALOPECIA: ICD-10-CM

## 2018-08-09 DIAGNOSIS — E78.5 HYPERLIPIDEMIA LDL GOAL <100: ICD-10-CM

## 2018-08-09 PROBLEM — Z13.6 CARDIOVASCULAR SCREENING; LDL GOAL LESS THAN 100: Status: RESOLVED | Noted: 2018-05-17 | Resolved: 2018-08-09

## 2018-08-09 PROCEDURE — 99396 PREV VISIT EST AGE 40-64: CPT | Performed by: INTERNAL MEDICINE

## 2018-08-09 RX ORDER — AMITRIPTYLINE HYDROCHLORIDE 10 MG/1
10 TABLET ORAL DAILY
Qty: 90 TABLET | Refills: 3 | Status: SHIPPED | OUTPATIENT
Start: 2018-08-09 | End: 2019-08-15

## 2018-08-09 RX ORDER — FINASTERIDE 1 MG/1
1 TABLET, FILM COATED ORAL DAILY
Qty: 100 TABLET | Refills: 3 | Status: SHIPPED | OUTPATIENT
Start: 2018-08-09 | End: 2019-08-15

## 2018-08-09 ASSESSMENT — ANXIETY QUESTIONNAIRES
1. FEELING NERVOUS, ANXIOUS, OR ON EDGE: NOT AT ALL
2. NOT BEING ABLE TO STOP OR CONTROL WORRYING: NOT AT ALL
5. BEING SO RESTLESS THAT IT IS HARD TO SIT STILL: NOT AT ALL
GAD7 TOTAL SCORE: 0
6. BECOMING EASILY ANNOYED OR IRRITABLE: NOT AT ALL
IF YOU CHECKED OFF ANY PROBLEMS ON THIS QUESTIONNAIRE, HOW DIFFICULT HAVE THESE PROBLEMS MADE IT FOR YOU TO DO YOUR WORK, TAKE CARE OF THINGS AT HOME, OR GET ALONG WITH OTHER PEOPLE: NOT DIFFICULT AT ALL
3. WORRYING TOO MUCH ABOUT DIFFERENT THINGS: NOT AT ALL
7. FEELING AFRAID AS IF SOMETHING AWFUL MIGHT HAPPEN: NOT AT ALL

## 2018-08-09 ASSESSMENT — PATIENT HEALTH QUESTIONNAIRE - PHQ9: 5. POOR APPETITE OR OVEREATING: NOT AT ALL

## 2018-08-09 NOTE — PROGRESS NOTES
SUBJECTIVE:   CC: Chano Vaz is an 57 year old male who presents for preventative health visit.     Healthy Habits:Answers for HPI/ROS submitted by the patient on 8/6/2018   Annual Exam:  Getting at least 3 servings of Calcium per day:: Yes  Bi-annual eye exam:: NO  Dental care twice a year:: Yes  Sleep apnea or symptoms of sleep apnea:: None  Diet:: Regular (no restrictions)  Frequency of exercise:: 4-5 days/week  Taking medications regularly:: Yes  Medication side effects:: Not applicable  Additional concerns today:: No  PHQ-2 Score: 0  Duration of exercise:: 45-60 minutes    Patient had some GERD   Ranitidine caused some missed beats  No chest pain or shortness of breath now      Orders Only on 08/07/2018   Component Date Value Ref Range Status     TSH 08/07/2018 4.46* 0.40 - 4.00 mU/L Final     Sodium 08/07/2018 140  133 - 144 mmol/L Final     Potassium 08/07/2018 4.0  3.4 - 5.3 mmol/L Final     Chloride 08/07/2018 104  94 - 109 mmol/L Final     Carbon Dioxide 08/07/2018 29  20 - 32 mmol/L Final     Anion Gap 08/07/2018 7  3 - 14 mmol/L Final     Glucose 08/07/2018 88  70 - 99 mg/dL Final    Fasting specimen     Urea Nitrogen 08/07/2018 18  7 - 30 mg/dL Final     Creatinine 08/07/2018 1.03  0.66 - 1.25 mg/dL Final     GFR Estimate 08/07/2018 74  >60 mL/min/1.7m2 Final    Non  GFR Calc     GFR Estimate If Black 08/07/2018 89  >60 mL/min/1.7m2 Final    African American GFR Calc     Calcium 08/07/2018 8.4* 8.5 - 10.1 mg/dL Final     Bilirubin Total 08/07/2018 2.1* 0.2 - 1.3 mg/dL Final     Albumin 08/07/2018 3.7  3.4 - 5.0 g/dL Final     Protein Total 08/07/2018 7.4  6.8 - 8.8 g/dL Final     Alkaline Phosphatase 08/07/2018 92  40 - 150 U/L Final     ALT 08/07/2018 23  0 - 70 U/L Final     AST 08/07/2018 17  0 - 45 U/L Final     Cholesterol 08/07/2018 223* <200 mg/dL Final    Desirable:       <200 mg/dl     Triglycerides 08/07/2018 128  <150 mg/dL Final    Fasting specimen     HDL  Cholesterol 08/07/2018 57  >39 mg/dL Final     LDL Cholesterol Calculated 08/07/2018 140* <100 mg/dL Final    Comment: Above desirable:  100-129 mg/dl  Borderline High:  130-159 mg/dL  High:             160-189 mg/dL  Very high:       >189 mg/dl       Non HDL Cholesterol 08/07/2018 166* <130 mg/dL Final    Comment: Above Desirable:  130-159 mg/dl  Borderline high:  160-189 mg/dl  High:             190-219 mg/dl  Very high:       >219 mg/dl       PSA 08/07/2018 0.36  0 - 4 ug/L Final    Assay Method:  Chemiluminescence using Siemens Vista analyzer     T4 Free 08/07/2018 0.96  0.76 - 1.46 ng/dL Final         Today's PHQ-2 Score:   PHQ-2 ( 1999 Pfizer) 8/6/2018 8/15/2017   Q1: Little interest or pleasure in doing things 0 0   Q2: Feeling down, depressed or hopeless 0 0   PHQ-2 Score 0 0   Q1: Little interest or pleasure in doing things Not at all Not at all   Q2: Feeling down, depressed or hopeless Not at all Not at all   PHQ-2 Score 0 0       Abuse: Current or Past(Physical, Sexual or Emotional)- No  Do you feel safe in your environment - Yes    Social History   Substance Use Topics     Smoking status: Never Smoker     Smokeless tobacco: Never Used     Alcohol use 1.5 - 2.5 oz/week      Comment: social, twice a week      If you drink alcohol do you typically have >3 drinks per day or >7 drinks per week? No                      Last PSA:   PSA   Date Value Ref Range Status   08/07/2018 0.36 0 - 4 ug/L Final     Comment:     Assay Method:  Chemiluminescence using Siemens Vista analyzer       Reviewed orders with patient. Reviewed health maintenance and updated orders accordingly - Yes  Patient Active Problem List   Diagnosis     Internal hemorrhoids with other complication     Left hip pain     Family hx of prostate cancer     Family history of thyroid cancer     Knee pain     Hypothyroidism     Advanced directives, counseling/discussion     Hyperlipidemia LDL goal <100     Past Surgical History:   Procedure Laterality  "Date     COLONOSCOPY  2007       Social History   Substance Use Topics     Smoking status: Never Smoker     Smokeless tobacco: Never Used     Alcohol use 1.5 - 2.5 oz/week      Comment: social, twice a week     Family History   Problem Relation Age of Onset     Hypertension Father      Thyroid Disease Father      Arthritis Mother      SLE     Prostate Cancer Maternal Grandfather      Cancer Daughter 18     thyroid     Prostate Cancer Maternal Uncle 70         Current Outpatient Prescriptions   Medication Sig Dispense Refill     amitriptyline (ELAVIL) 10 MG tablet Take 1 tablet (10 mg) by mouth daily 90 tablet 3     Ascorbic Acid (VITAMIN C PO) Take 1 tablet by mouth daily        ASPIRIN 81 MG OR TABS ONE DAILY 100 3     finasteride (PROPECIA) 1 MG tablet Take 1 tablet (1 mg) by mouth daily 100 tablet 3     FISH  MG OR CAPS 2 caps a day       [DISCONTINUED] amitriptyline (ELAVIL) 10 MG tablet TAKE 1 TABLET BY MOUTH EVERY DAY 90 tablet 0       Reviewed and updated as needed this visit by clinical staff  Tobacco  Allergies  Meds  Problems  Soc Hx        Reviewed and updated as needed this visit by Provider  Allergies  Meds  Problems            ROS:  10 point ROS of systems including Constitutional, Eyes, Respiratory, Cardiovascular, Gastroenterology, Genitourinary, Integumentary, Muscularskeletal, Psychiatric were all negative except for pertinent positives noted in my HPI.    OBJECTIVE:   /70 (BP Location: Left arm, Patient Position: Chair, Cuff Size: Adult Regular)  Pulse 76  Temp 97.6  F (36.4  C) (Tympanic)  Ht 6' 3\" (1.905 m)  Wt 169 lb 14.4 oz (77.1 kg)  SpO2 96%  BMI 21.24 kg/m2  EXAM:  GENERAL: healthy, alert and no distress  EYES: Eyes grossly normal to inspection, PERRL and conjunctivae and sclerae normal  HENT: ear canals and TM's normal, nose and mouth without ulcers or lesions  NECK: no adenopathy, no asymmetry, masses, or scars and thyroid normal to palpation  RESP: lungs clear " to auscultation - no rales, rhonchi or wheezes  CV: regular rate and rhythm, normal S1 S2, no S3 or S4, no murmur, click or rub, no peripheral edema and peripheral pulses strong  ABDOMEN: soft, nontender, no hepatosplenomegaly, no masses and bowel sounds normal   (male): normal male genitalia without lesions or urethral discharge, no hernia  RECTAL: normal sphincter tone, no rectal masses, prostate normal size, smooth, nontender without nodules or masses  MS: no gross musculoskeletal defects noted, no edema  SKIN: no suspicious lesions or rashes  Sun damage  Many nevi  NEURO: Normal strength and tone, mentation intact and speech normal  PSYCH: mentation appears normal, affect normal/bright        ASSESSMENT/PLAN:   Chano was seen today for physical.    Diagnoses and all orders for this visit:    Routine general medical examination at a health care facility  We talked about Shingrix also  GERD:  Avoid:  Bedtime meals  Heavy meals   Avoid coffee, mint,chocolates.    Any NSAIDS    Hyperlipidemia LDL goal <100  -     Lipid panel reflex to direct LDL Fasting; Future  -     **Hepatic panel FUTURE 2mo; Future  He does not needs statins  We used AHA calculator  Tension headache  -     amitriptyline (ELAVIL) 10 MG tablet; Take 1 tablet (10 mg) by mouth daily  Will stop and see   Alopecia  -     finasteride (PROPECIA) 1 MG tablet; Take 1 tablet (1 mg) by mouth daily    Acquired hypothyroidism  -     **TSH with free T4 reflex FUTURE 6mo; Future    Family hx of prostate cancer  PSA normal   Exam normal   Gilbert syndrome  Due to fasting status, bilirubin is noted to be high aka Gilbert's  It is a benign finding and liver profile is normal.    Will Repeat TSH also in few months      COUNSELING:  Reviewed preventive health counseling, as reflected in patient instructions       Regular exercise       Healthy diet/nutrition    BP Readings from Last 1 Encounters:   08/09/18 121/70     Estimated body mass index is 21.24 kg/(m^2)  "as calculated from the following:    Height as of this encounter: 6' 3\" (1.905 m).    Weight as of this encounter: 169 lb 14.4 oz (77.1 kg).           reports that he has never smoked. He has never used smokeless tobacco.      Counseling Resources:  ATP IV Guidelines  Pooled Cohorts Equation Calculator  FRAX Risk Assessment  ICSI Preventive Guidelines  Dietary Guidelines for Americans, 2010  USDA's MyPlate  ASA Prophylaxis  Lung CA Screening    Zenobia Ramirez MD  [facility]  "

## 2018-08-09 NOTE — MR AVS SNAPSHOT
After Visit Summary   8/9/2018    Chano Vaz    MRN: [id number]           Patient Information     Date Of Birth          1960        Visit Information        Provider Department      8/9/2018 9:30 AM Zenobia Ramirez MD [facility]        Today's Diagnoses     Routine general medical examination at a health care facility    -  1    Hyperlipidemia LDL goal <100        Tension headache        Alopecia        Acquired hypothyroidism        Family hx of prostate cancer        Gilbert syndrome          Care Instructions      Preventive Health Recommendations  Male Ages 50 - 64    Yearly exam:             See your health care provider every year in order to  o   Review health changes.   o   Discuss preventive care.    o   Review your medicines if your doctor has prescribed any.     Have a cholesterol test every 5 years, or more frequently if you are at risk for high cholesterol/heart disease.     Have a diabetes test (fasting glucose) every three years. If you are at risk for diabetes, you should have this test more often.     Have a colonoscopy at age 50, or have a yearly FIT test (stool test). These exams will check for colon cancer.      Talk with your health care provider about whether or not a prostate cancer screening test (PSA) is right for you.    You should be tested each year for STDs (sexually transmitted diseases), if you re at risk.     Shots: Get a flu shot each year. Get a tetanus shot every 10 years.     Nutrition:    Eat at least 5 servings of fruits and vegetables daily.     Eat whole-grain bread, whole-wheat pasta and brown rice instead of white grains and rice.     Get adequate Calcium and Vitamin D.     Lifestyle    Exercise for at least 150 minutes a week (30 minutes a day, 5 days a week). This will help you control your weight and prevent disease.     Limit alcohol to one drink per day.     No smoking.     Wear sunscreen to prevent skin cancer.     See your  "dentist every six months for an exam and cleaning.     See your eye doctor every 1 to 2 years.  Can a bowl of oatmeal help lower your cholesterol? How about a handful of walnuts or an avocado? A few simple tweaks to your diet -- like these, along with exercise and other heart-healthy habits -- might help you lower your cholesterol.  Oatmeal contains soluble fiber, which reduces your low-density lipoprotein (LDL), the \"bad\" cholesterol. Soluble fiber is also found in such foods as kidney beans, apples, pears, barley and prunes.  Soluble fiber can reduce the absorption of cholesterol into your bloodstream. Five to 10 grams or more of soluble fiber a day decreases your total and LDL cholesterol. Eating 1 1/2 cups of cooked oatmeal provides 6 grams of fiber. If you add fruit, such as bananas, you'll add about 4 more grams of fiber. To mix it up a little, try steel-cut oatmeal or cold cereal made with oatmeal or oat bran.  Eating fatty fish can be heart healthy because of its high levels of omega-3 fatty acids, which can reduce your blood pressure and risk of developing blood clots. In people who have already had heart attacks, fish oil -- or omega-3 fatty acids -- may reduce the risk of sudden death.  Although omega-3 fatty acids don't affect LDL levels, because of their other heart benefits, the American Heart Association recommends eating at least two servings of fish a week. The highest levels of omega-3 fatty acids are in:  Mackerel   Lake trout   Herring   Sardines   Albacore tuna   [place]   Halibut  You should bake or grill the fish to avoid adding unhealthy fats. If you don't like fish, you can also get small amounts of omega-3 fatty acids from foods such as ground flaxseed or canola oil.  You can take an omega-3 or fish oil supplement to get some of the benefits, but you won't get other nutrients in fish, such as selenium. If you decide to take a supplement, talk to your doctor about how much you should " take.  Walnuts, almonds and other tree nuts can improve blood cholesterol. Rich in mono- and polyunsaturated fatty acids, walnuts also help keep blood vessels healthy.  Eating about a handful (1.5 ounces, or 42.5 grams) a day of most nuts, such as almonds, hazelnuts, peanuts, pecans, some pine nuts, pistachio nuts and walnuts, may reduce your risk of heart disease. Make sure the nuts you eat aren't salted or coated with sugar.  All nuts are high in calories, so a handful will do. To avoid eating too many nuts and gaining weight, replace foods high in saturated fat with nuts. For example, instead of using cheese, meat or croutons in your salad, add a handful of walnuts or almonds.  Avocados are a potent source of nutrients as well as monounsaturated fatty acids (MUFAs). According to a recent study, adding an avocado a day to a heart-healthy diet can help improve LDL levels in people who are overweight or obese.  People tend to be most familiar with avocados in [facility], which usually is eaten with high-fat corn chips. Try adding avocado slices to salads and sandwiches or eating them as a side dish. Also try guacamole with raw cut vegetables, such as cucumber slices.  Replacing saturated fats, such as those found in meats, with MUFAs are part of what makes the Mediterranean diet heart healthy.  Another good source of MUFAs is olive oil.  Try using about 2 tablespoons (23 grams) of olive oil a day in place of other fats in your diet to get its heart-healthy benefits. To add olive oil to your diet, you can saute vegetables in it, add it to a marinade or mix it with vinegar as a salad dressing. You can also use olive oil as a substitute for butter when basting meat or as a dip for bread.  Both avocados and olive oil are high in calories, so don't eat more than the recommended amount.  Foods are available that have been fortified with sterols or stanols -- substances found in plants that help block the absorption of  "cholesterol.  Some margarines, orange juice and yogurt drinks come with added plant sterols and can help reduce LDL cholesterol by 5 to 15 percent. The amount of daily plant sterols needed for results is at least 2 grams -- which equals about two 8-ounce (237-milliliter) servings of plant sterol-fortified orange juice a day.  It's not clear whether food with plant sterols or stanols reduce your risk of heart attack or stroke, although experts assume that foods that reduce cholesterol do reduce the risk. Plant sterols or stanols don't appear to affect levels of triglycerides or of high-density lipoprotein (HDL), the \"good\" cholesterol.  Whey protein, which is one of two proteins in dairy products -- the other is casein -- may account for many of the health benefits attributed to dairy. Studies have shown that whey protein given as a supplement lowers both LDL and total cholesterol.  You can find whey protein powders in health food stores and some grocery stores. Follow the package directions for how to use them.  For any of these foods to provide their benefit, you need to make other changes to your diet and lifestyle.  Although some fats are healthy, you need to limit the saturated and trans fats you eat. Saturated fats, like those in meat, butter, cheese and other full-fat dairy products, and some oils, raise your total cholesterol. Trans fats, often used in margarines and store-bought cookies, crackers and cakes, are particularly bad for your cholesterol levels. Trans fats raise LDL cholesterol, and lower high-density lipoprotein (HDL), the \"good\" cholesterol.  Food labels report the content of trans fats, but, unfortunately, only in foods that contain at least one gram per serving. That means you could be getting some trans fats in a number of foods, which could add up to enough trans fats in a day to be unhealthy and increase cholesterol. If a food label lists \"partially hydrogenated oil,\" it has trans fat, and " it's best to avoid it.  In addition to changing your diet, making other heart-healthy lifestyle changes is pulido to improving your cholesterol. Exercising, quitting smoking and maintaining a healthy weight will help keep your cholesterol at a healthy level.      Due to fasting status, bilirubin is noted to be high aka Gilbert's  It is a benign finding and liver profile is normal.    Calcium is present in   Cheese  2. Yogurt  3. Milk  4. Sardines  5. Dark leafy greens like spinach, kale, turnips, and shruthi greens  6. Fortified cereals such as Total, Raisin Bran, Corn Flakes (They have a lot of calcium in one serving.)  7. Fortified orange juice  8. Soybeans  9. Fortified soymilk (Not all soymilk is a good source of calcium, so it's best to check the label.)  10. Enriched breads, grains, and waffles            Follow-ups after your visit        Future tests that were ordered for you today     Open Future Orders        Priority Expected Expires Ordered    **TSH with free T4 reflex FUTURE 6mo Routine 2/5/2019 3/7/2019 8/9/2018    Lipid panel reflex to direct LDL Fasting Routine 2/5/2019 3/7/2019 8/9/2018    **Hepatic panel FUTURE 2mo Routine 10/8/2018 3/7/2019 8/9/2018            Who to contact     If you have questions or need follow up information about today's clinic visit or your schedule please contact [facility] directly at [phone].  Normal or non-critical lab and imaging results will be communicated to you by MyChart, letter or phone within 4 business days after the clinic has received the results. If you do not hear from us within 7 days, please contact the clinic through virtual [facility] or phone. If you have a critical or abnormal lab result, we will notify you by phone as soon as possible.  Submit refill requests through [employer] or call your pharmacy and they will forward the refill request to us. Please allow 3 business days for your refill to be completed.          Additional Information About Your  "Visit        [facility] Information     [facility] gives you secure access to your electronic health record. If you see a primary care provider, you can also send messages to your care team and make appointments. If you have questions, please call your primary care clinic.  If you do not have a primary care provider, please call [phone] and they will assist you.        Care EveryWhere ID     This is your Care EveryWhere ID. This could be used by other organizations to access your [facility] medical records  [id number]        Your Vitals Were     Pulse Temperature Height Pulse Oximetry BMI (Body Mass Index)       76 97.6  F (36.4  C) (Tympanic) 6' 3\" (1.905 m) 96% 21.24 kg/m2        Blood Pressure from Last 3 Encounters:   08/09/18 121/70   08/17/17 116/78   08/12/16 129/87    Weight from Last 3 Encounters:   08/09/18 169 lb 14.4 oz (77.1 kg)   08/17/17 172 lb (78 kg)   08/12/16 170 lb 4 oz (77.2 kg)                 Today's Medication Changes          These changes are accurate as of 8/9/18 10:11 AM.  If you have any questions, ask your nurse or doctor.               These medicines have changed or have updated prescriptions.        Dose/Directions    amitriptyline 10 MG tablet   Commonly known as:  ELAVIL   This may have changed:  See the new instructions.   Used for:  Tension headache   Changed by:  Zenobia Ramirez MD        Dose:  10 mg   Take 1 tablet (10 mg) by mouth daily   Quantity:  90 tablet   Refills:  3            Where to get your medicines      Some of these will need a paper prescription and others can be bought over the counter.  Ask your nurse if you have questions.     Bring a paper prescription for each of these medications     amitriptyline 10 MG tablet    finasteride 1 MG tablet                Primary Care Provider Office Phone # Fax #    Zenobia Ramirez [phone] [address] [place] [address] [place] [id number]        Equal Access to Services     NIKKIE CANTU AH: Sujitii nelida scruggs " Gianni, tejinder pearceadaha, qajuan ata kalatrice nascimento, brianna judein hayaan lauracolleen joaquinajyoti laAshishamadeo fabio. So [facility] [phone].    ATENCIÓN: Si lisa carter, tiene a miranda disposición servicios gratuitos de asistencia lingüística. Candido al [phone].    We comply with applicable federal civil rights laws and [place] laws. We do not discriminate on the basis of race, color, national origin, age, disability, sex, sexual orientation, or gender identity.            Thank you!     Thank you for choosing [facility]  for your care. Our goal is always to provide you with excellent care. Hearing back from our patients is one way we can continue to improve our services. Please take a few minutes to complete the written survey that you may receive in the mail after your visit with us. Thank you!             Your Updated Medication List - Protect others around you: Learn how to safely use, store and throw away your medicines at www.disposemymeds.org.          This list is accurate as of 8/9/18 10:11 AM.  Always use your most recent med list.                   Brand Name Dispense Instructions for use Diagnosis    amitriptyline 10 MG tablet    ELAVIL    90 tablet    Take 1 tablet (10 mg) by mouth daily    Tension headache       aspirin 81 MG tablet     100    ONE DAILY    Routine general medical examination at a health care facility       finasteride 1 MG tablet    PROPECIA    100 tablet    Take 1 tablet (1 mg) by mouth daily    Alopecia       Fish Oil 500 MG Caps      2 caps a day        VITAMIN C PO      Take 1 tablet by mouth daily

## 2018-08-09 NOTE — NURSING NOTE
"Chief Complaint   Patient presents with     Physical        Initial /70 (BP Location: Left arm, Patient Position: Chair, Cuff Size: Adult Regular)  Pulse 76  Temp 97.6  F (36.4  C) (Tympanic)  Ht 6' 3\" (1.905 m)  Wt 169 lb 14.4 oz (77.1 kg)  SpO2 96%  BMI 21.24 kg/m2 Estimated body mass index is 21.24 kg/(m^2) as calculated from the following:    Height as of this encounter: 6' 3\" (1.905 m).    Weight as of this encounter: 169 lb 14.4 oz (77.1 kg)..    BP completed using cuff size: regular  MEDICATIONS REVIEWED  SOCIAL AND FAMILY HX REVIEWED  Holley Roque CMA  "

## 2018-08-09 NOTE — PATIENT INSTRUCTIONS
"  Preventive Health Recommendations  Male Ages 50 - 64    Yearly exam:             See your health care provider every year in order to  o   Review health changes.   o   Discuss preventive care.    o   Review your medicines if your doctor has prescribed any.     Have a cholesterol test every 5 years, or more frequently if you are at risk for high cholesterol/heart disease.     Have a diabetes test (fasting glucose) every three years. If you are at risk for diabetes, you should have this test more often.     Have a colonoscopy at age 50, or have a yearly FIT test (stool test). These exams will check for colon cancer.      Talk with your health care provider about whether or not a prostate cancer screening test (PSA) is right for you.    You should be tested each year for STDs (sexually transmitted diseases), if you re at risk.     Shots: Get a flu shot each year. Get a tetanus shot every 10 years.     Nutrition:    Eat at least 5 servings of fruits and vegetables daily.     Eat whole-grain bread, whole-wheat pasta and brown rice instead of white grains and rice.     Get adequate Calcium and Vitamin D.     Lifestyle    Exercise for at least 150 minutes a week (30 minutes a day, 5 days a week). This will help you control your weight and prevent disease.     Limit alcohol to one drink per day.     No smoking.     Wear sunscreen to prevent skin cancer.     See your dentist every six months for an exam and cleaning.     See your eye doctor every 1 to 2 years.  Can a bowl of oatmeal help lower your cholesterol? How about a handful of walnuts or an avocado? A few simple tweaks to your diet -- like these, along with exercise and other heart-healthy habits -- might help you lower your cholesterol.  Oatmeal contains soluble fiber, which reduces your low-density lipoprotein (LDL), the \"bad\" cholesterol. Soluble fiber is also found in such foods as kidney beans, apples, pears, barley and prunes.  Soluble fiber can reduce the " absorption of cholesterol into your bloodstream. Five to 10 grams or more of soluble fiber a day decreases your total and LDL cholesterol. Eating 1 1/2 cups of cooked oatmeal provides 6 grams of fiber. If you add fruit, such as bananas, you'll add about 4 more grams of fiber. To mix it up a little, try steel-cut oatmeal or cold cereal made with oatmeal or oat bran.  Eating fatty fish can be heart healthy because of its high levels of omega-3 fatty acids, which can reduce your blood pressure and risk of developing blood clots. In people who have already had heart attacks, fish oil -- or omega-3 fatty acids -- may reduce the risk of sudden death.  Although omega-3 fatty acids don't affect LDL levels, because of their other heart benefits, the American Heart Association recommends eating at least two servings of fish a week. The highest levels of omega-3 fatty acids are in:  Mackerel   Lake trout   Herring   Sardines   Albacore tuna   [place]   Halibut  You should bake or grill the fish to avoid adding unhealthy fats. If you don't like fish, you can also get small amounts of omega-3 fatty acids from foods such as ground flaxseed or canola oil.  You can take an omega-3 or fish oil supplement to get some of the benefits, but you won't get other nutrients in fish, such as selenium. If you decide to take a supplement, talk to your doctor about how much you should take.  Walnuts, almonds and other tree nuts can improve blood cholesterol. Rich in mono- and polyunsaturated fatty acids, walnuts also help keep blood vessels healthy.  Eating about a handful (1.5 ounces, or 42.5 grams) a day of most nuts, such as almonds, hazelnuts, peanuts, pecans, some pine nuts, pistachio nuts and walnuts, may reduce your risk of heart disease. Make sure the nuts you eat aren't salted or coated with sugar.  All nuts are high in calories, so a handful will do. To avoid eating too many nuts and gaining weight, replace foods high in saturated fat  with nuts. For example, instead of using cheese, meat or croutons in your salad, add a handful of walnuts or almonds.  Avocados are a potent source of nutrients as well as monounsaturated fatty acids (MUFAs). According to a recent study, adding an avocado a day to a heart-healthy diet can help improve LDL levels in people who are overweight or obese.  People tend to be most familiar with avocados [facility], which usually is eaten with high-fat corn chips. Try adding avocado slices to salads and sandwiches or eating them as a side dish. Also try guacamole with raw cut vegetables, such as cucumber slices.  Replacing saturated fats, such as those found in meats, with MUFAs are part of what makes the Mediterranean diet heart healthy.  Another good source of MUFAs is olive oil.  Try using about 2 tablespoons (23 grams) of olive oil a day in place of other fats in your diet to get its heart-healthy benefits. To add olive oil to your diet, you can saute vegetables in it, add it to a marinade or mix it with vinegar as a salad dressing. You can also use olive oil as a substitute for butter when basting meat or as a dip for bread.  Both avocados and olive oil are high in calories, so don't eat more than the recommended amount.  Foods are available that have been fortified with sterols or stanols -- substances found in plants that help block the absorption of cholesterol.  Some margarines, orange juice and yogurt drinks come with added plant sterols and can help reduce LDL cholesterol by 5 to 15 percent. The amount of daily plant sterols needed for results is at least 2 grams -- which equals about two 8-ounce (237-milliliter) servings of plant sterol-fortified orange juice a day.  It's not clear whether food with plant sterols or stanols reduce your risk of heart attack or stroke, although experts assume that foods that reduce cholesterol do reduce the risk. Plant sterols or stanols don't appear to affect levels of  "triglycerides or of high-density lipoprotein (HDL), the \"good\" cholesterol.  Whey protein, which is one of two proteins in dairy products -- the other is casein -- may account for many of the health benefits attributed to dairy. Studies have shown that whey protein given as a supplement lowers both LDL and total cholesterol.  You can find whey protein powders in health food stores and some grocery stores. Follow the package directions for how to use them.  For any of these foods to provide their benefit, you need to make other changes to your diet and lifestyle.  Although some fats are healthy, you need to limit the saturated and trans fats you eat. Saturated fats, like those in meat, butter, cheese and other full-fat dairy products, and some oils, raise your total cholesterol. Trans fats, often used in margarines and store-bought cookies, crackers and cakes, are particularly bad for your cholesterol levels. Trans fats raise LDL cholesterol, and lower high-density lipoprotein (HDL), the \"good\" cholesterol.  Food labels report the content of trans fats, but, unfortunately, only in foods that contain at least one gram per serving. That means you could be getting some trans fats in a number of foods, which could add up to enough trans fats in a day to be unhealthy and increase cholesterol. If a food label lists \"partially hydrogenated oil,\" it has trans fat, and it's best to avoid it.  In addition to changing your diet, making other heart-healthy lifestyle changes is pulido to improving your cholesterol. Exercising, quitting smoking and maintaining a healthy weight will help keep your cholesterol at a healthy level.      Due to fasting status, bilirubin is noted to be high aka Gilbert's  It is a benign finding and liver profile is normal.    Calcium is present in   Cheese  2. Yogurt  3. Milk  4. Sardines  5. Dark leafy greens like spinach, kale, turnips, and shruthi greens  6. Fortified cereals such as Total, Raisin Bran, " Corn Flakes (They have a lot of calcium in one serving.)  7. Fortified orange juice  8. Soybeans  9. Fortified soymilk (Not all soymilk is a good source of calcium, so it's best to check the label.)  10. Enriched breads, grains, and waffles

## 2018-08-10 ASSESSMENT — ANXIETY QUESTIONNAIRES: GAD7 TOTAL SCORE: 0

## 2018-08-10 ASSESSMENT — PATIENT HEALTH QUESTIONNAIRE - PHQ9: SUM OF ALL RESPONSES TO PHQ QUESTIONS 1-9: 1

## 2018-10-31 DIAGNOSIS — G44.209 TENSION HEADACHE: ICD-10-CM

## 2018-10-31 NOTE — TELEPHONE ENCOUNTER
"Requested Prescriptions   Pending Prescriptions Disp Refills     amitriptyline (ELAVIL) 10 MG tablet [Pharmacy Med Name: AMITRIPTYLINE HCL 10 MG TAB]  Last Written Prescription Date:  8/9/18  Last Fill Quantity: 90 TABLET,  # refills: 3   Last office visit: 8/9/2018 with prescribing provider:  SHARLENE   Future Office Visit:     90 tablet 0     Sig: TAKE 1 TABLET BY MOUTH EVERY DAY. REFILLS AT UPCOMING VISIT    Tricyclic Agents ( Annual appt and no PHQ9) Passed    10/31/2018  2:04 AM       Passed - Blood Pressure under 140/90 in past 12 mos    BP Readings from Last 3 Encounters:   08/09/18 121/70   08/17/17 116/78   08/12/16 129/87                Passed - Recent (12 mo) or future (30 days) visit within authorizing provider's specialty    Patient had office visit in the last 12 months or has a visit in the next 30 days with authorizing provider or within the authorizing provider's specialty.  See \"Patient Info\" tab in inbasket, or \"Choose Columns\" in Meds & Orders section of the refill encounter.             Passed - Patient is age 18 or older          "

## 2018-10-31 NOTE — TELEPHONE ENCOUNTER
"Last Written Prescription Date:  8/9/18  Last Fill Quantity: 90,  # refills: 3   Last office visit: 8/9/2018 with prescribing provider:  Ashley     Future Office Visit:      Requested Prescriptions   Pending Prescriptions Disp Refills     amitriptyline (ELAVIL) 10 MG tablet [Pharmacy Med Name: AMITRIPTYLINE HCL 10 MG TAB] 90 tablet 0     Sig: TAKE 1 TABLET BY MOUTH EVERY DAY. REFILLS AT UPCOMING VISIT    Tricyclic Agents ( Annual appt and no PHQ9) Passed    10/31/2018  2:54 PM       Passed - Blood Pressure under 140/90 in past 12 mos    BP Readings from Last 3 Encounters:   08/09/18 121/70   08/17/17 116/78   08/12/16 129/87                Passed - Recent (12 mo) or future (30 days) visit within authorizing provider's specialty    Patient had office visit in the last 12 months or has a visit in the next 30 days with authorizing provider or within the authorizing provider's specialty.  See \"Patient Info\" tab in inbasket, or \"Choose Columns\" in Meds & Orders section of the refill encounter.             Passed - Patient is age 18 or older          "

## 2018-11-01 RX ORDER — AMITRIPTYLINE HYDROCHLORIDE 10 MG/1
TABLET ORAL
Qty: 90 TABLET | Refills: 0 | OUTPATIENT
Start: 2018-11-01

## 2018-11-01 NOTE — TELEPHONE ENCOUNTER
1 year Rx sent in August to [facility] #[phone] - Refusal with note sent to pharmacy    Elva HAIR RN

## 2019-02-06 DIAGNOSIS — E03.9 ACQUIRED HYPOTHYROIDISM: ICD-10-CM

## 2019-02-06 DIAGNOSIS — E78.5 HYPERLIPIDEMIA LDL GOAL <100: ICD-10-CM

## 2019-02-06 PROCEDURE — 80076 HEPATIC FUNCTION PANEL: CPT | Performed by: INTERNAL MEDICINE

## 2019-02-06 PROCEDURE — 36415 COLL VENOUS BLD VENIPUNCTURE: CPT | Performed by: INTERNAL MEDICINE

## 2019-02-06 PROCEDURE — 80061 LIPID PANEL: CPT | Performed by: INTERNAL MEDICINE

## 2019-02-06 PROCEDURE — 84443 ASSAY THYROID STIM HORMONE: CPT | Performed by: INTERNAL MEDICINE

## 2019-02-06 NOTE — LETTER
[facility]  [address]  [address]  [place]  [address]  Tel: [phone]    February 7, 2019    Chano TERRY J[address]ks[address]  [address] [id number]        Dear Mr. Vaz,    Please schedule an appointment with Dr Ramirez to discuss your annual blood test results.   Resulted Orders   **Hepatic panel FUTURE 2mo   Result Value Ref Range    Bilirubin Direct 0.2 0.0 - 0.2 mg/dL    Bilirubin Total 1.5 (H) 0.2 - 1.3 mg/dL    Albumin 3.8 3.4 - 5.0 g/dL    Protein Total 7.5 6.8 - 8.8 g/dL    Alkaline Phosphatase 87 40 - 150 U/L    ALT 20 0 - 70 U/L    AST 17 0 - 45 U/L   **TSH with free T4 reflex FUTURE 6mo   Result Value Ref Range    TSH 2.59 0.40 - 4.00 mU/L   Lipid panel reflex to direct LDL Fasting   Result Value Ref Range    Cholesterol 219 (H) <200 mg/dL      Comment:      Desirable:       <200 mg/dl    Triglycerides 117 <150 mg/dL    HDL Cholesterol 54 >39 mg/dL    LDL Cholesterol Calculated 142 (H) <100 mg/dL      Comment:      Above desirable:  100-129 mg/dl  Borderline High:  130-159 mg/dL  High:             160-189 mg/dL  Very high:       >189 mg/dl      Non HDL Cholesterol 165 (H) <130 mg/dL      Comment:      Above Desirable:  130-159 mg/dl  Borderline high:  160-189 mg/dl  High:             190-219 mg/dl  Very high:       >219 mg/dl         If you have any further questions or problems, please contact our office.      Sincerely,    France Benito NP / charles

## 2019-02-07 LAB
ALBUMIN SERPL-MCNC: 3.8 G/DL (ref 3.4–5)
ALP SERPL-CCNC: 87 U/L (ref 40–150)
ALT SERPL W P-5'-P-CCNC: 20 U/L (ref 0–70)
AST SERPL W P-5'-P-CCNC: 17 U/L (ref 0–45)
BILIRUB DIRECT SERPL-MCNC: 0.2 MG/DL (ref 0–0.2)
BILIRUB SERPL-MCNC: 1.5 MG/DL (ref 0.2–1.3)
CHOLEST SERPL-MCNC: 219 MG/DL
HDLC SERPL-MCNC: 54 MG/DL
LDLC SERPL CALC-MCNC: 142 MG/DL
NONHDLC SERPL-MCNC: 165 MG/DL
PROT SERPL-MCNC: 7.5 G/DL (ref 6.8–8.8)
TRIGL SERPL-MCNC: 117 MG/DL
TSH SERPL DL<=0.005 MIU/L-ACNC: 2.59 MU/L (ref 0.4–4)

## 2019-06-05 ENCOUNTER — TELEPHONE (OUTPATIENT)
Dept: FAMILY MEDICINE | Facility: CLINIC | Age: 59
End: 2019-06-05

## 2019-06-05 DIAGNOSIS — E03.9 ACQUIRED HYPOTHYROIDISM: ICD-10-CM

## 2019-06-05 DIAGNOSIS — E78.5 HYPERLIPIDEMIA LDL GOAL <100: Primary | ICD-10-CM

## 2019-06-05 DIAGNOSIS — Z12.5 SCREENING PSA (PROSTATE SPECIFIC ANTIGEN): ICD-10-CM

## 2019-06-05 NOTE — TELEPHONE ENCOUNTER
Reason for Call: Request for an order or referral:    Order or referral being requested: fasting labs for px and TSH    Date needed: 8/7/19    Has the patient been seen by the PCP for this problem? NO    Additional comments: patient wants to make sure TSH is ordered along with regular fasting labs for px.    Phone number Patient can be reached at:  Home number on file [phone] (home)    Best Time:  anytime    Can we leave a detailed message on this number?  YES    Call taken on 6/5/2019 at 9:23 AM by Marisol Pacheco.

## 2019-06-05 NOTE — TELEPHONE ENCOUNTER
Patient requesting fasting labs and TSH to be done before physical on 08/07/19. Please advise.    Lloyd Castanon CMA on 6/5/2019 at 9:41 AM

## 2019-06-07 NOTE — TELEPHONE ENCOUNTER
Called and LVM for patient to inform him that Dr. Ramirez has put in the orders. Tiffanie Wade, REGINALD on 6/7/2019 at 9:19 AM

## 2019-08-07 DIAGNOSIS — E03.9 ACQUIRED HYPOTHYROIDISM: ICD-10-CM

## 2019-08-07 DIAGNOSIS — E78.5 HYPERLIPIDEMIA LDL GOAL <100: ICD-10-CM

## 2019-08-07 DIAGNOSIS — Z12.5 SCREENING PSA (PROSTATE SPECIFIC ANTIGEN): ICD-10-CM

## 2019-08-07 LAB
ANION GAP SERPL CALCULATED.3IONS-SCNC: 7 MMOL/L (ref 3–14)
BUN SERPL-MCNC: 14 MG/DL (ref 7–30)
CALCIUM SERPL-MCNC: 8.7 MG/DL (ref 8.5–10.1)
CHLORIDE SERPL-SCNC: 106 MMOL/L (ref 94–109)
CHOLEST SERPL-MCNC: 205 MG/DL
CO2 SERPL-SCNC: 28 MMOL/L (ref 20–32)
CREAT SERPL-MCNC: 1 MG/DL (ref 0.66–1.25)
GFR SERPL CREATININE-BSD FRML MDRD: 81 ML/MIN/{1.73_M2}
GLUCOSE SERPL-MCNC: 91 MG/DL (ref 70–99)
HDLC SERPL-MCNC: 49 MG/DL
LDLC SERPL CALC-MCNC: 119 MG/DL
NONHDLC SERPL-MCNC: 156 MG/DL
POTASSIUM SERPL-SCNC: 3.7 MMOL/L (ref 3.4–5.3)
PSA SERPL-ACNC: 0.42 UG/L (ref 0–4)
SODIUM SERPL-SCNC: 141 MMOL/L (ref 133–144)
T4 FREE SERPL-MCNC: 0.98 NG/DL (ref 0.76–1.46)
TRIGL SERPL-MCNC: 185 MG/DL
TSH SERPL DL<=0.005 MIU/L-ACNC: 5.72 MU/L (ref 0.4–4)

## 2019-08-07 PROCEDURE — 80048 BASIC METABOLIC PNL TOTAL CA: CPT | Performed by: INTERNAL MEDICINE

## 2019-08-07 PROCEDURE — G0103 PSA SCREENING: HCPCS | Performed by: INTERNAL MEDICINE

## 2019-08-07 PROCEDURE — 84439 ASSAY OF FREE THYROXINE: CPT | Performed by: INTERNAL MEDICINE

## 2019-08-07 PROCEDURE — 80061 LIPID PANEL: CPT | Performed by: INTERNAL MEDICINE

## 2019-08-07 PROCEDURE — 84443 ASSAY THYROID STIM HORMONE: CPT | Performed by: INTERNAL MEDICINE

## 2019-08-07 PROCEDURE — 36415 COLL VENOUS BLD VENIPUNCTURE: CPT | Performed by: INTERNAL MEDICINE

## 2019-08-15 ENCOUNTER — OFFICE VISIT (OUTPATIENT)
Dept: FAMILY MEDICINE | Facility: CLINIC | Age: 59
End: 2019-08-15
Payer: COMMERCIAL

## 2019-08-15 VITALS
HEART RATE: 69 BPM | BODY MASS INDEX: 21.51 KG/M2 | OXYGEN SATURATION: 99 % | WEIGHT: 173 LBS | SYSTOLIC BLOOD PRESSURE: 129 MMHG | HEIGHT: 75 IN | DIASTOLIC BLOOD PRESSURE: 85 MMHG | TEMPERATURE: 97.3 F

## 2019-08-15 DIAGNOSIS — L65.9 ALOPECIA: ICD-10-CM

## 2019-08-15 DIAGNOSIS — E78.5 HYPERLIPIDEMIA LDL GOAL <100: ICD-10-CM

## 2019-08-15 DIAGNOSIS — E03.9 ACQUIRED HYPOTHYROIDISM: ICD-10-CM

## 2019-08-15 DIAGNOSIS — E80.4 GILBERT SYNDROME: ICD-10-CM

## 2019-08-15 DIAGNOSIS — G44.209 TENSION HEADACHE: ICD-10-CM

## 2019-08-15 DIAGNOSIS — Z80.42 FAMILY HX OF PROSTATE CANCER: ICD-10-CM

## 2019-08-15 DIAGNOSIS — Z00.00 ROUTINE GENERAL MEDICAL EXAMINATION AT A HEALTH CARE FACILITY: Primary | ICD-10-CM

## 2019-08-15 PROCEDURE — 99396 PREV VISIT EST AGE 40-64: CPT | Performed by: INTERNAL MEDICINE

## 2019-08-15 RX ORDER — FINASTERIDE 1 MG/1
1 TABLET, FILM COATED ORAL DAILY
Qty: 90 TABLET | Refills: 3 | Status: SHIPPED | OUTPATIENT
Start: 2019-08-15 | End: 2020-11-11

## 2019-08-15 RX ORDER — AMITRIPTYLINE HYDROCHLORIDE 10 MG/1
5-10 TABLET ORAL DAILY PRN
Qty: 90 TABLET | Refills: 3 | Status: SHIPPED | OUTPATIENT
Start: 2019-08-15 | End: 2020-09-18

## 2019-08-15 ASSESSMENT — MIFFLIN-ST. JEOR: SCORE: 1676.38

## 2019-08-15 NOTE — PATIENT INSTRUCTIONS
The shingrix is our newer vaccine and is recommended to healthy adults over 55. It's administered in 2 separate doses. You can just walk into the pharmacy and get it when convenient for you. However, it's on a national shortage currently so we recommend calling the pharmacy a head of time to verify they have it in stock ([facility] and many other pharmacies carry this). Generally speaking the pharmacy is covered better by insurance than the clinic.  Please check with your insurance to make sure what is covered/what your copay would be.     Here is a link from the CDC on Shingles Vaccines, where they provide information on this new Shingrix vaccine.   https://www.cdc.gov/shingles/vaccination.html

## 2019-08-15 NOTE — PROGRESS NOTES
SUBJECTIVE:   CC: Chano Vaz is an 58 year old male who presents for preventative health visit.     Healthy Habits:     Getting at least 3 servings of Calcium per day:  Yes    Bi-annual eye exam:  Yes    Dental care twice a year:  Yes    Sleep apnea or symptoms of sleep apnea:  None    Diet:  Regular (no restrictions)    Frequency of exercise:  2-3 days/week    Duration of exercise:  45-60 minutes    Taking medications regularly:  Yes    Medication side effects:  None    PHQ-2 Total Score: 0    Additional concerns today:  No      Patient is doing well       Today's PHQ-2 Score:   PHQ-2 ( 1999 Pfizer) 8/12/2019   Q1: Little interest or pleasure in doing things 0   Q2: Feeling down, depressed or hopeless 0   PHQ-2 Score 0   Q1: Little interest or pleasure in doing things Not at all   Q2: Feeling down, depressed or hopeless Not at all   PHQ-2 Score 0       Abuse: Current or Past(Physical, Sexual or Emotional)- No  Do you feel safe in your environment? Yes    Social History     Tobacco Use     Smoking status: Never Smoker     Smokeless tobacco: Never Used   Substance Use Topics     Alcohol use: Yes     Alcohol/week: 1.5 - 2.5 oz     Comment: social, twice a week         Alcohol Use 8/12/2019   Prescreen: >3 drinks/day or >7 drinks/week? No   Prescreen: >3 drinks/day or >7 drinks/week? -       Last PSA:   PSA   Date Value Ref Range Status   08/07/2019 0.42 0 - 4 ug/L Final     Comment:     Assay Method:  Chemiluminescence using Siemens Vista analyzer       Reviewed orders with patient. Reviewed health maintenance and updated orders accordingly - Yes  Patient Active Problem List   Diagnosis     Internal hemorrhoids with other complication     Left hip pain     Family hx of prostate cancer     Family history of thyroid cancer     Knee pain     Hypothyroidism     Advanced directives, counseling/discussion     Hyperlipidemia LDL goal <100     Past Surgical History:   Procedure Laterality Date     COLONOSCOPY  2007  "      Social History     Tobacco Use     Smoking status: Never Smoker     Smokeless tobacco: Never Used   Substance Use Topics     Alcohol use: Yes     Alcohol/week: 1.5 - 2.5 oz     Comment: social, twice a week     Family History   Problem Relation Age of Onset     Hypertension Father      Thyroid Disease Father      Arthritis Mother         SLE     Prostate Cancer Maternal Grandfather      Cancer Daughter 18        thyroid     Prostate Cancer Maternal Uncle 70         Current Outpatient Medications   Medication Sig Dispense Refill     amitriptyline (ELAVIL) 10 MG tablet Take 0.5-1 tablets (5-10 mg) by mouth daily as needed for sleep 90 tablet 3     Ascorbic Acid (VITAMIN C PO) Take 1 tablet by mouth daily        ASPIRIN 81 MG OR TABS ONE DAILY 100 3     finasteride (PROPECIA) 1 MG tablet Take 1 tablet (1 mg) by mouth daily 90 tablet 3     FISH  MG OR CAPS 2 caps a day       Allergies   Allergen Reactions     No Known Allergies        Reviewed and updated as needed this visit by clinical staff  Tobacco  Allergies  Meds  Problems  Med Hx  Surg Hx  Fam Hx         Reviewed and updated as needed this visit by Provider  Tobacco  Allergies  Meds  Problems  Med Hx  Surg Hx  Fam Hx            Review of Systems  10 point ROS of systems including Constitutional, Eyes, Respiratory, Cardiovascular, Gastroenterology, Genitourinary, Integumentary, Muscularskeletal, Psychiatric were all negative except for pertinent positives noted in my HPI.    OBJECTIVE:   /85   Pulse 69   Temp 97.3  F (36.3  C) (Tympanic)   Ht 1.899 m (6' 2.75\")   Wt 78.5 kg (173 lb)   SpO2 99%   BMI 21.77 kg/m      Physical Exam  GENERAL: healthy, alert and no distress  EYES: Eyes grossly normal to inspection, PERRL and conjunctivae and sclerae normal  HENT: ear canals and TM's normal, nose and mouth without ulcers or lesions  NECK: no adenopathy, no asymmetry, masses, or scars and thyroid normal to palpation  RESP: lungs " clear to auscultation - no rales, rhonchi or wheezes  CV: regular rate and rhythm, normal S1 S2, no S3 or S4, no murmur, click or rub, no peripheral edema and peripheral pulses strong  ABDOMEN: soft, nontender, no hepatosplenomegaly, no masses and bowel sounds normal   (male): normal male genitalia without lesions or urethral discharge, no hernia  RECTAL: normal sphincter tone, no rectal masses, prostate normal size, smooth, nontender without nodules or masses eczema and on the anal area  MS: no gross musculoskeletal defects noted, no edema  SKIN: no suspicious lesions or rashes  NEURO: Normal strength and tone, mentation intact and speech normal  PSYCH: mentation appears normal, affect normal/bright      Orders Only on 08/07/2019   Component Date Value Ref Range Status     PSA 08/07/2019 0.42  0 - 4 ug/L Final    Assay Method:  Chemiluminescence using Siemens Vista analyzer     Cholesterol 08/07/2019 205* <200 mg/dL Final    Desirable:       <200 mg/dl     Triglycerides 08/07/2019 185* <150 mg/dL Final    Comment: Borderline high:  150-199 mg/dl  High:             200-499 mg/dl  Very high:       >499 mg/dl  Fasting specimen       HDL Cholesterol 08/07/2019 49  >39 mg/dL Final     LDL Cholesterol Calculated 08/07/2019 119* <100 mg/dL Final    Comment: Above desirable:  100-129 mg/dl  Borderline High:  130-159 mg/dL  High:             160-189 mg/dL  Very high:       >189 mg/dl       Non HDL Cholesterol 08/07/2019 156* <130 mg/dL Final    Comment: Above Desirable:  130-159 mg/dl  Borderline high:  160-189 mg/dl  High:             190-219 mg/dl  Very high:       >219 mg/dl       Sodium 08/07/2019 141  133 - 144 mmol/L Final     Potassium 08/07/2019 3.7  3.4 - 5.3 mmol/L Final     Chloride 08/07/2019 106  94 - 109 mmol/L Final     Carbon Dioxide 08/07/2019 28  20 - 32 mmol/L Final     Anion Gap 08/07/2019 7  3 - 14 mmol/L Final     Glucose 08/07/2019 91  70 - 99 mg/dL Final    Fasting specimen     Urea Nitrogen  08/07/2019 14  7 - 30 mg/dL Final     Creatinine 08/07/2019 1.00  0.66 - 1.25 mg/dL Final     GFR Estimate 08/07/2019 81  >60 mL/min/[1.73_m2] Final    Comment: Non  GFR Calc  Starting 12/18/2018, serum creatinine based estimated GFR (eGFR) will be   calculated using the Chronic Kidney Disease Epidemiology Collaboration   (CKD-EPI) equation.       GFR Estimate If Black 08/07/2019 >90  >60 mL/min/[1.73_m2] Final    Comment:  GFR Calc  Starting 12/18/2018, serum creatinine based estimated GFR (eGFR) will be   calculated using the Chronic Kidney Disease Epidemiology Collaboration   (CKD-EPI) equation.       Calcium 08/07/2019 8.7  8.5 - 10.1 mg/dL Final     TSH 08/07/2019 5.72* 0.40 - 4.00 mU/L Final     T4 Free 08/07/2019 0.98  0.76 - 1.46 ng/dL Final       ASSESSMENT/PLAN:   Chano was seen today for physical.    Diagnoses and all orders for this visit:    Routine general medical examination at a health care facility  -     **HIV Antigen Antibody Combo FUTURE anytime; Future    The shingrix is our newer vaccine and is recommended to healthy adults over 55. It's administered in 2 separate doses. You can just walk into the pharmacy and get it when convenient for you. However, it's on a national shortage currently so we recommend calling the pharmacy a head of time to verify they have it in stock ([facility] and many other pharmacies carry this). Generally speaking the pharmacy is covered better by insurance than the clinic.  Please check with your insurance to make sure what is covered/what your copay would be.     Here is a link from the CDC on Shingles Vaccines, where they provide information on this new Shingrix vaccine.   https://www.cdc.gov/shingles/vaccination.html     Acquired hypothyroidism  -     **TSH with free T4 reflex FUTURE anytime; Future  We are watching if this is subclinical   hyperlipidemia LDL goal <100  9.5% risk by AHA calculation he wishes to wait and  "repeat lipid profile  Alopecia  -     finasteride (PROPECIA) 1 MG tablet; Take 1 tablet (1 mg) by mouth daily  No side effects  Family hx of prostate cancer  No symptoms and normal PSA  Gilbert syndrome  We will watch  Tension headache  -     amitriptyline (ELAVIL) 10 MG tablet; Take 0.5-1 tablets (5-10 mg) by mouth daily as needed for sleep  Doing well      COUNSELING:   Reviewed preventive health counseling, as reflected in patient instructions       Regular exercise       Healthy diet/nutrition    Estimated body mass index is 21.77 kg/m  as calculated from the following:    Height as of this encounter: 1.899 m (6' 2.75\").    Weight as of this encounter: 78.5 kg (173 lb).          reports that he has never smoked. He has never used smokeless tobacco.      Counseling Resources:  ATP IV Guidelines  Pooled Cohorts Equation Calculator  FRAX Risk Assessment  ICSI Preventive Guidelines  Dietary Guidelines for Americans, 2010  USDA's MyPlate  ASA Prophylaxis  Lung CA Screening    Zenobia Ramirez MD  [facility]  "

## 2019-11-05 ENCOUNTER — HEALTH MAINTENANCE LETTER (OUTPATIENT)
Age: 59
End: 2019-11-05

## 2020-06-20 ENCOUNTER — MYC MEDICAL ADVICE (OUTPATIENT)
Dept: FAMILY MEDICINE | Facility: CLINIC | Age: 60
End: 2020-06-20

## 2020-06-20 DIAGNOSIS — L23.7 CONTACT DERMATITIS DUE TO POISON IVY: Primary | ICD-10-CM

## 2020-06-22 RX ORDER — PREDNISONE 20 MG/1
40 TABLET ORAL DAILY
Qty: 10 TABLET | Refills: 0 | Status: SHIPPED | OUTPATIENT
Start: 2020-06-22 | End: 2020-06-27

## 2020-06-22 NOTE — TELEPHONE ENCOUNTER
Please see My Chart message below and advise as appropriate.  Do you want patient to schedule a virtual visit?      Pharmacy is pended.    JANUSZ Maria, RN  [employer] [facility]

## 2020-09-18 ENCOUNTER — ANCILLARY PROCEDURE (OUTPATIENT)
Dept: GENERAL RADIOLOGY | Facility: CLINIC | Age: 60
End: 2020-09-18
Attending: INTERNAL MEDICINE
Payer: COMMERCIAL

## 2020-09-18 ENCOUNTER — OFFICE VISIT (OUTPATIENT)
Dept: INTERNAL MEDICINE | Facility: CLINIC | Age: 60
End: 2020-09-18
Payer: COMMERCIAL

## 2020-09-18 VITALS
OXYGEN SATURATION: 100 % | SYSTOLIC BLOOD PRESSURE: 133 MMHG | DIASTOLIC BLOOD PRESSURE: 82 MMHG | HEART RATE: 77 BPM | BODY MASS INDEX: 21.01 KG/M2 | WEIGHT: 167 LBS

## 2020-09-18 DIAGNOSIS — E78.5 HYPERLIPIDEMIA LDL GOAL <100: ICD-10-CM

## 2020-09-18 DIAGNOSIS — Z12.11 SCREENING FOR COLON CANCER: ICD-10-CM

## 2020-09-18 DIAGNOSIS — Z80.42 FAMILY HX OF PROSTATE CANCER: Primary | ICD-10-CM

## 2020-09-18 DIAGNOSIS — E03.9 HYPOTHYROIDISM, UNSPECIFIED TYPE: ICD-10-CM

## 2020-09-18 DIAGNOSIS — Z80.42 FAMILY HX OF PROSTATE CANCER: ICD-10-CM

## 2020-09-18 DIAGNOSIS — M54.50 BILATERAL LOW BACK PAIN WITHOUT SCIATICA, UNSPECIFIED CHRONICITY: ICD-10-CM

## 2020-09-18 DIAGNOSIS — G44.209 TENSION HEADACHE: ICD-10-CM

## 2020-09-18 LAB
ALBUMIN SERPL-MCNC: 3.8 G/DL (ref 3.4–5)
ALP SERPL-CCNC: 81 U/L (ref 40–150)
ALT SERPL W P-5'-P-CCNC: 28 U/L (ref 0–70)
ANION GAP SERPL CALCULATED.3IONS-SCNC: 5 MMOL/L (ref 3–14)
AST SERPL W P-5'-P-CCNC: 18 U/L (ref 0–45)
BILIRUB SERPL-MCNC: 1.8 MG/DL (ref 0.2–1.3)
BUN SERPL-MCNC: 21 MG/DL (ref 7–30)
CALCIUM SERPL-MCNC: 8.6 MG/DL (ref 8.5–10.1)
CHLORIDE SERPL-SCNC: 105 MMOL/L (ref 94–109)
CHOLEST SERPL-MCNC: 198 MG/DL
CO2 SERPL-SCNC: 30 MMOL/L (ref 20–32)
CREAT SERPL-MCNC: 1.03 MG/DL (ref 0.66–1.25)
GFR SERPL CREATININE-BSD FRML MDRD: 77 ML/MIN/{1.73_M2}
GLUCOSE SERPL-MCNC: 92 MG/DL (ref 70–99)
HDLC SERPL-MCNC: 62 MG/DL
LDLC SERPL CALC-MCNC: 118 MG/DL
NONHDLC SERPL-MCNC: 136 MG/DL
POTASSIUM SERPL-SCNC: 3.6 MMOL/L (ref 3.4–5.3)
PROT SERPL-MCNC: 7.6 G/DL (ref 6.8–8.8)
PSA SERPL-ACNC: 0.48 UG/L (ref 0–4)
SODIUM SERPL-SCNC: 140 MMOL/L (ref 133–144)
THYROGLOB AB SERPL IA-ACNC: <20 IU/ML (ref 0–40)
THYROPEROXIDASE AB SERPL-ACNC: 24 IU/ML
TRIGL SERPL-MCNC: 90 MG/DL
TSH SERPL DL<=0.005 MIU/L-ACNC: 3.62 MU/L (ref 0.4–4)

## 2020-09-18 RX ORDER — AMITRIPTYLINE HYDROCHLORIDE 10 MG/1
5-10 TABLET ORAL DAILY PRN
Qty: 90 TABLET | Refills: 3 | Status: SHIPPED | OUTPATIENT
Start: 2020-09-18

## 2020-09-18 ASSESSMENT — PAIN SCALES - GENERAL: PAINLEVEL: MILD PAIN (2)

## 2020-09-18 NOTE — NURSING NOTE
Chief Complaint   Patient presents with     Establish Care     pt here to [facility], annual       Jami Valdez CMA, EMT at 7:54 AM on 9/18/2020.

## 2020-09-20 NOTE — PROGRESS NOTES
"Chano was seen today for establish care.  He is generally in good health but is due for a physical, some screening tests, and also a refill of elavil which he was taking at bedtime to help with both headaches and sleep.  He is on a very low dose with no side effects and I sent refills to his pharmacy.    He also endorses some lower back pain and more of a \"curve\" to the spine.  The pain does not radiate and there are no neurologic symptoms.  He's had borderline elevated TSH levels in the past and is hoping to have that rechecked today.      No changes to past, family, or social history and  ROS: 10 point ROS neg other than the symptoms noted above in the HPI.    PHYSICAL EXAM:  /82 (BP Location: Right arm, Patient Position: Sitting, Cuff Size: Adult Regular)   Pulse 77   Wt 75.8 kg (167 lb)   SpO2 100%   BMI 21.01 kg/m    Constitutional: no distress, comfortable, pleasant   Eyes: anicteric, normal extra-ocular movements   Ears, Nose and Throat: tympanic membranes clear, nose clear and free of lesions, throat clear, neck supple with full range of motion, no thyromegaly.   Cardiovascular: regular rate and rhythm, normal S1 and S2, no murmurs, rubs or gallops,  peripheral pulses full and symmetric   Respiratory: clear to auscultation, no wheezes or crackles, normal breath sounds   Gastrointestinal: positive bowel sounds, nontender, no hepatosplenomegaly, no masses   Musculoskeletal: knees full range of motion, no effusion.  Spine:  Mild lordosis noted lower lumbar area.  Minimal tenderness to palpation of the paraspinous muscles.  Skin: no concerning lesions, no jaundice   Neurological: normal gait, no tremor   Psychological: appropriate mood   Lymphatic: no cervical lymphadenopathy and no peripheral edema      A/P 60 year old male here to establish care, obtain physical and update screening, as well as issue of back pain.  Will start with plain film and consider PT.      Family hx of prostate cancer  -     " PSA screen; Future    Hypothyroidism, unspecified type  -     TSH with free T4 reflex; Future  -     Anti thyroglobulin antibody; Future  -     Thyroid peroxidase antibody; Future    Hyperlipidemia LDL goal <100  -     Comprehensive metabolic panel; Future  -     Lipid Profile FASTING; Future    Bilateral low back pain without sciatica, unspecified chronicity  -     XR Lumbar Spine 2-3 Views; Future    Tension headache  -     amitriptyline (ELAVIL) 10 MG tablet; Take 0.5-1 tablets (5-10 mg) by mouth daily as needed for sleep    Screening for colon cancer  -     GASTROENTEROLOGY ADULT REF PROCEDURE ONLY; Future    RTC for annual physical or sooner prn    Naomie Gonzalez MD

## 2020-09-24 ENCOUNTER — MYC MEDICAL ADVICE (OUTPATIENT)
Dept: INTERNAL MEDICINE | Facility: CLINIC | Age: 60
End: 2020-09-24

## 2020-10-25 DIAGNOSIS — Z11.59 ENCOUNTER FOR SCREENING FOR OTHER VIRAL DISEASES: Primary | ICD-10-CM

## 2020-10-28 NOTE — TELEPHONE ENCOUNTER
DIAGNOSIS: Curve in Back and Left Hand Pain referred by Dr. Carlos Akbar from [facility]   APPOINTMENT DATE: 11/11/2020   NOTES STATUS DETAILS   OFFICE NOTE from referring provider Internal [facility]:  9/21/20 - MyChart referral from Dr. Carlos Akbar  9/18/20 - [facility] OV with Carlos Akbar   OFFICE NOTE from other specialist Internal [facility] Corie Morton:  12/20/12 to 12/23/13 - PT OVs with Kimberly Saucedo PT    Carmen Cormier:  9/17/12 - [facility] OV with Dr. Ramirez   DISCHARGE SUMMARY from hospital N/A    DISCHARGE REPORT from the ER N/A    OPERATIVE REPORT N/A    MEDICATION LIST Internal    MRI N/A    CT SCAN N/A    XRAYS (IMAGES & REPORTS) Internal eal:  9/18/20 - XR Lumbar Spine  11/27/12 - XR Cervical Spine

## 2020-11-11 ENCOUNTER — ANCILLARY PROCEDURE (OUTPATIENT)
Dept: GENERAL RADIOLOGY | Facility: CLINIC | Age: 60
End: 2020-11-11
Payer: COMMERCIAL

## 2020-11-11 ENCOUNTER — OFFICE VISIT (OUTPATIENT)
Dept: ORTHOPEDICS | Facility: CLINIC | Age: 60
End: 2020-11-11
Payer: COMMERCIAL

## 2020-11-11 ENCOUNTER — PRE VISIT (OUTPATIENT)
Dept: ORTHOPEDICS | Facility: CLINIC | Age: 60
End: 2020-11-11

## 2020-11-11 VITALS — BODY MASS INDEX: 20.51 KG/M2 | WEIGHT: 165 LBS | HEIGHT: 75 IN | RESPIRATION RATE: 16 BRPM

## 2020-11-11 DIAGNOSIS — M18.12 ARTHRITIS OF CARPOMETACARPAL (CMC) JOINT OF LEFT THUMB: Primary | ICD-10-CM

## 2020-11-11 DIAGNOSIS — M79.642 LEFT HAND PAIN: ICD-10-CM

## 2020-11-11 DIAGNOSIS — M54.50 BILATERAL LOW BACK PAIN WITHOUT SCIATICA, UNSPECIFIED CHRONICITY: ICD-10-CM

## 2020-11-11 DIAGNOSIS — M79.642 LEFT HAND PAIN: Primary | ICD-10-CM

## 2020-11-11 PROCEDURE — 99203 OFFICE O/P NEW LOW 30 MIN: CPT | Performed by: FAMILY MEDICINE

## 2020-11-11 PROCEDURE — 73130 X-RAY EXAM OF HAND: CPT | Mod: LT | Performed by: RADIOLOGY

## 2020-11-11 ASSESSMENT — MIFFLIN-ST. JEOR: SCORE: 1630.1

## 2020-11-11 NOTE — LETTER
11/11/2020      RE: Chano Vaz  [address] [facility] [address]        Subjective:   Chano Vaz is a 60 year old male who is RHD with left thumb/hand pain. 2 summers ago was moving rocks and injured hand. Also wants to discuss curvature in back. He has a desk job.  Moving rocks with one hand, injury? Hasn't had it looked at and thought something had pulled.  Screw or pinching/grasping with landscaping, gets painful.  Hurts at the base of his thumb.  Started to have a bit of a curvature.  Physical a couple months ago.  Experimenting with different chairs, strap for shoulders, trying to sit straight.  Not as bad in the morning, worse in the afternoon.  No muscle fatigue.  Exercises for as him as he gets older.  No pain associated with his back.  Curvature at upper lumbar/thoracic area    Background:   Date of injury: Summer 2018   Duration of symptoms: 2 years  Mechanism of Injury: Acute; Activity Related   Aggravating factors: pinching, gripping, lifting.   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 He also has no past medical history of Arthritis, Cerebral infarction (H), Congestive heart failure (H), COPD (chronic obstructive pulmonary disease) (H), Depressive disorder, Diabetes (H), Heart disease, History of blood transfusion, Hypertension, or Uncomplicated asthma.  He  has a past surgical history that includes colonoscopy (2007).  His family history includes Arthritis in his mother; Cancer (age of onset: 18) in his daughter; Hypertension in his father; Prostate Cancer in his maternal grandfather; Prostate Cancer (age of onset: 70) in  "his maternal uncle; Thyroid Disease in his father.  He reports that he has never smoked. He has never used smokeless tobacco. He reports current alcohol use of about 2.5 - 4.2 standard drinks of alcohol per week. He reports that he does not use drugs.    ALLERGIES: He is allergic to no known allergies.    CURRENT MEDICATIONS: He has a current medication list which includes the following prescription(s): amitriptyline, ascorbic acid, aspirin, and fish oil.     REVIEW OF SYSTEMS: 10 point review of systems is negative except as noted above.     Exam:   Resp 16   Ht 1.899 m (6' 2.75\")   Wt 74.8 kg (165 lb)   BMI 20.76 kg/m             CONSTITUTIONAL: alert, no distress and cooperative  HEAD: Normocephalic. No masses, lesions, tenderness or abnormalities  SKIN: no suspicious lesions or rashes  GAIT: normal  NEUROLOGIC: Non-focal  PSYCHIATRIC: affect normal/bright and mentation appears normal.    MUSCULOSKELETAL: left thumb pain    Inspection:Thumb:  normal  Tender: Thumb:   CMC  Non-tender: Thumb:   MCP joint, IP joint, no triggering  Range of Motion All Normal  Strength: full strength  Special tests: tenderness thenar eminence           Assessment/Plan:   Pt is a 59 yo white male with PMhx of hypothyroidism presenting with left thumb pain and questions about his back  1. Left CMC arthritis-  Hand therapy suggested  Custom fabrication if needed  Tylenol/NSAIDs prn, heat, ice    2. Lumbar spondylosis, facet arthropathy- Low back discomfort in certain positions-  X-ray discussed, degenerative changes, mild  Continue to remain strong  Exercises  Chose office chair that is the most comfortable    RTC prn, 4 weeks after hand therapy    X-RAY INTERPRETATION:   X-Ray of the Left Hand/Fingers: 3-view, left thumb  ordered and interpreted in the office today was positive for IMPRESSION: Osteoarthrosis at the left triscaphe and left first carpometacarpal joint.      Sana Butts MD    "

## 2020-11-13 NOTE — TELEPHONE ENCOUNTER
FUTURE VISIT INFORMATION      FUTURE VISIT INFORMATION:    Date: 12/8/20    Time: 8:20am    Location: [facility]  REFERRAL INFORMATION:    Referring provider:  self    Referring providers clinic:  N/A    Reason for visit/diagnosis  General eye exam    RECORDS REQUESTED FROM:       No recs to collect

## 2020-11-22 ENCOUNTER — HEALTH MAINTENANCE LETTER (OUTPATIENT)
Age: 60
End: 2020-11-22

## 2020-12-01 DIAGNOSIS — Z11.59 ENCOUNTER FOR SCREENING FOR OTHER VIRAL DISEASES: Primary | ICD-10-CM

## 2020-12-01 NOTE — TELEPHONE ENCOUNTER
FUTURE VISIT INFORMATION      FUTURE VISIT INFORMATION:    Date: 12/15/20    Time: 8:20am    Location: [facility]  REFERRAL INFORMATION:    Referring provider:  self    Referring providers clinic:  N/A    Reason for visit/diagnosis  General eye exam     RECORDS REQUESTED FROM:         No recs to collect

## 2020-12-08 ENCOUNTER — PRE VISIT (OUTPATIENT)
Dept: OPHTHALMOLOGY | Facility: CLINIC | Age: 60
End: 2020-12-08

## 2020-12-15 ENCOUNTER — OFFICE VISIT (OUTPATIENT)
Dept: OPHTHALMOLOGY | Facility: CLINIC | Age: 60
End: 2020-12-15
Payer: COMMERCIAL

## 2020-12-15 ENCOUNTER — PRE VISIT (OUTPATIENT)
Dept: OPHTHALMOLOGY | Facility: CLINIC | Age: 60
End: 2020-12-15

## 2020-12-15 DIAGNOSIS — H52.00 HYPERMETROPIA, UNSPECIFIED LATERALITY: Primary | ICD-10-CM

## 2020-12-15 DIAGNOSIS — Z11.59 ENCOUNTER FOR SCREENING FOR OTHER VIRAL DISEASES: ICD-10-CM

## 2020-12-15 DIAGNOSIS — H25.13 NUCLEAR SCLEROTIC CATARACT OF BOTH EYES: ICD-10-CM

## 2020-12-15 DIAGNOSIS — H43.813 PVD (POSTERIOR VITREOUS DETACHMENT), BILATERAL: ICD-10-CM

## 2020-12-15 DIAGNOSIS — H47.399 NON-GLAUCOMATOUS OPTIC DISC CUPPING: ICD-10-CM

## 2020-12-15 PROCEDURE — 99000 SPECIMEN HANDLING OFFICE-LAB: CPT | Performed by: PATHOLOGY

## 2020-12-15 PROCEDURE — 92004 COMPRE OPH EXAM NEW PT 1/>: CPT | Performed by: OPHTHALMOLOGY

## 2020-12-15 PROCEDURE — U0003 INFECTIOUS AGENT DETECTION BY NUCLEIC ACID (DNA OR RNA); SEVERE ACUTE RESPIRATORY SYNDROME CORONAVIRUS 2 (SARS-COV-2) (CORONAVIRUS DISEASE [COVID-19]), AMPLIFIED PROBE TECHNIQUE, MAKING USE OF HIGH THROUGHPUT TECHNOLOGIES AS DESCRIBED BY CMS-2020-01-R: HCPCS | Mod: 90 | Performed by: PATHOLOGY

## 2020-12-15 PROCEDURE — 92015 DETERMINE REFRACTIVE STATE: CPT | Performed by: OPHTHALMOLOGY

## 2020-12-15 ASSESSMENT — REFRACTION_MANIFEST
OS_CYLINDER: SPHERE
OS_SPHERE: +0.25
OS_ADD: +2.50
OD_CYLINDER: +0.25
OD_ADD: +2.50
OD_AXIS: 175
OD_SPHERE: +0.50

## 2020-12-15 ASSESSMENT — EXTERNAL EXAM - LEFT EYE: OS_EXAM: NORMAL

## 2020-12-15 ASSESSMENT — TONOMETRY
OS_IOP_MMHG: 14
IOP_METHOD: ICARE
OD_IOP_MMHG: 13

## 2020-12-15 ASSESSMENT — EXTERNAL EXAM - RIGHT EYE: OD_EXAM: NORMAL

## 2020-12-15 ASSESSMENT — CONF VISUAL FIELD
OS_NORMAL: 1
METHOD: COUNTING FINGERS
OD_NORMAL: 1

## 2020-12-15 ASSESSMENT — VISUAL ACUITY
OD_SC: 20/30
OD_PH_SC: 20/20
OD_SC+: -2
METHOD: SNELLEN - LINEAR
OS_SC+: -1
OS_SC: 20/20

## 2020-12-15 ASSESSMENT — SLIT LAMP EXAM - LIDS
COMMENTS: NORMAL
COMMENTS: NORMAL

## 2020-12-15 ASSESSMENT — CUP TO DISC RATIO
OS_RATIO: 0.55
OD_RATIO: 0.6

## 2020-12-15 NOTE — NURSING NOTE
Chief Complaint(s) and History of Present Illness(es)     COMPREHENSIVE EYE EXAM     In both eyes.  Onset was gradual.  This started years ago.  Charactertized as  blurred vision.  Severity is moderate.  Associated symptoms include Negative for dryness, eye pain, redness and tearing.  Pain was noted as 0/10.              Comments     Pt is here for a complete eye exam. Pt previously was seen at [facility] by Dr. Quiñones. Pt does notes some changes in vision, RE fuzzy >LE x gradually x the last several years.     Ocular meds: None    MINAL Mercer 8:18 AM December 15, 2020

## 2020-12-15 NOTE — PROGRESS NOTES
HPI  Chano Vaz is a 60 year old male here for comprehensive eye exam.  Previously went to [facility], no eye health issues he recalls.  Notes mild blurry vision, right eye more than left over the last few years but not wearing any distance glasses.  Using over the counter readers. No eye pain or irritation.  Had new prominent floaters a few years ago but not as noticeable anymore.     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      Floaters      Headache      Headache      Hyperlipidemia LDL goal <100 8/9/2018     Hypothyroidism      Knee pain 7/24/2014     Nonsenile cataract       POH: over the counter readers only, early cataracts, no surgery, no trauma, no history of glaucoma work-up that he recalls   Oc Meds: none  FH: Denies any glaucoma, age related macular degeneration, or other known eye diseases         Assessment & Plan        (H52.00) Hypermetropia, unspecified laterality - Both Eyes  (primary encounter diagnosis)  Comment: Very minimal, no increase on cycloplegic refraction, no symptoms with good visual acuity without correction   Plan:  Observe, no prescription given     (H52.00) Hypermetropia, unspecified laterality - Both Eyes  (primary encounter diagnosis)  Comment: would like to try distance prescription   Plan: manifest refraction done and prescription for glasses given, may try bifocal or separate readers and distance     (H25.13) Nuclear sclerotic cataract of both eyes - Both Eyes  Comment: mild not visually significant   Plan: follow    (H43.813) PVD (posterior vitreous detachment), bilateral - Both Eyes  Comment: longstanding symptoms, retina attached   Plan: Discussed with patient signs and symptoms of retinal tear/rd and to call if this occurs. Discussed option for laser vitreolysis as needed but he is less bothered now than at onset.      (H47.399)  Non-glaucomatous optic disc cupping - Both Eyes  Comment: no prior work-up, normal intraocular pressure, negative fh   Plan: request old records, OCT retinal nerve fiber layer if not done in past         -----------------------------------------------------------------------------------       Patient disposition:   Return in about 1 year (around 12/15/2021) for Comprehensive Exam, OCT nerve (RNFL) OU. Patient to call sooner as needed.    Complete documentation of historical and exam elements from today's encounter can be found in the full encounter summary report (not reduplicated in this progress note). I personally obtained the chief complaint(s) and history of present illness.  I have confirmed and edited as necessary the CC, HPI, PMH/PSH, social history, FMH, ROS, and exam/neuro findings as obtained by the technician or others. I have examined this patient myself and I personally viewed the image(s) and studies listed above and the documentation reflects my findings and interpretation.     Mariel Bae MD

## 2020-12-16 ENCOUNTER — TELEPHONE (OUTPATIENT)
Dept: GASTROENTEROLOGY | Facility: CLINIC | Age: 60
End: 2020-12-16

## 2020-12-16 ENCOUNTER — THERAPY VISIT (OUTPATIENT)
Dept: OCCUPATIONAL THERAPY | Facility: CLINIC | Age: 60
End: 2020-12-16
Payer: COMMERCIAL

## 2020-12-16 DIAGNOSIS — M18.12 ARTHRITIS OF CARPOMETACARPAL (CMC) JOINT OF LEFT THUMB: ICD-10-CM

## 2020-12-16 DIAGNOSIS — G89.29 CHRONIC THUMB PAIN, LEFT: Primary | ICD-10-CM

## 2020-12-16 DIAGNOSIS — M79.645 CHRONIC THUMB PAIN, LEFT: Primary | ICD-10-CM

## 2020-12-16 PROCEDURE — 97165 OT EVAL LOW COMPLEX 30 MIN: CPT | Mod: GO | Performed by: OCCUPATIONAL THERAPIST

## 2020-12-16 PROCEDURE — 97110 THERAPEUTIC EXERCISES: CPT | Mod: GO | Performed by: OCCUPATIONAL THERAPIST

## 2020-12-16 PROCEDURE — 97760 ORTHOTIC MGMT&TRAING 1ST ENC: CPT | Mod: GO | Performed by: OCCUPATIONAL THERAPIST

## 2020-12-16 NOTE — PROGRESS NOTES
"[facility] Therapy Initial Evaluation     Current Date: 12/16/2020    Diagnosis: Left thumb pain, left thumb CMC arthritis  DOI: About two years ago (Order: 11/11/2020)  Referring physician: Sana Butts MD    Subjective:    Patient Health History  Chano Vaz being seen for left hand, thumb joint.     Problem began: 7/1/2018.   Problem occurred: moving rocks   Pain is reported as 3/10 on pain scale.  General health as reported by patient is excellent.       Medical allergies: none.   Surgeries include:  None.        Current occupation is .   Primary job tasks include:  Computer work.                  The above history was provided by the patient.    Occupational Profile Information:  Right hand dominant  Prior functional level:  no limitations  Patient reports symptoms of pain and weakness/loss of strength  Special tests:  x-ray. Report from 11/11/2020- \"Degenerative osteoarthrosis at the triscaphe and left first carpometacarpal joint with joint space narrowing and osteophytic spurring.\"  Previous treatment: None  Barriers include:none  Mobility: No difficulty  Transportation: Drives  Currently working in normal job without restrictions  Leisure activities/hobbies: Landscaping, working out  Other: Patient reports he was lifting some rock two summers ago. He felt like he \"tore a ligament or tendon in his thumb.\" Pain is since unchanged.    Functional Outcome Measure:   Upper Extremity Functional Index Score:  SCORE: 76/80   (A lower score indicates greater disability.)        Objective:  Pain Level (Scale 0-10)   12/16/2020   At Rest 0-1/10   With Use 3-8/10     Pain Description  Date 12/16/2020   Location Thumb, CMC joint   Pain Quality Aching and Sharp   Frequency intermittent     Pain is worst  daytime   Exacerbated by Lifting, pulling, pinching   Relieved by Rest   Progression Unchanged     ROM  Thumb 12/16/2020 12/16/2020   AROM  (PROM) Right Left   MP 0/80 0/74   IP 0/55 HE/75+ "   RABD 65 55   PABD 62 57   Kapandji Opposition Scale (0-10/10) Small finger MP joint Small finger MP joint     Thumb Observation/Appearance   - none  + mild    ++ moderate    +++ severe     12/16/2020   Shoulder deformity present over CMC R: None  L: Slight   Edema over the CMC joint R: None  L: None   Noted collapse of MP into hyperextension during pinch R: None  L: None      Provocative Tests  Pain Report:  - none    + mild    ++ moderate    +++ severe     12/16/2020   CMC Grind test R: -  L: -   Crepitus present R: -  L: -   CMC Adduction Stress Test R: -  L: -   CMC Extension Stress Test R: -  L: +       Strength   (Measured in pounds)  Pain Report: - none  + mild    ++ moderate    +++ severe    12/16/2020 12/16/2020   Trials Right Left   1  2  3 120  130 104  105   Average 125 104.5     Lat Pinch 12/16/2020 12/16/2020   Trials Right Left   1 22 8+     3 Pt Pinch 12/16/2020 12/16/2020   Trials Right Left   1 18 14+   * Pain-free  and pinch strength    Palpation   Pain Report:  - none    + mild    ++ moderate    +++ severe    12/16/2020   CMC Joint Line R: -  L: +   CMC AP Joint Laxity R: -  L: -   Thenar Eminence R: -  L: -   Web Space R: -  L: -   1st DC R: -  L: -   FCR R: -  L: -     Palpation  No laxity of thumb MP joint collateral ligaments. No pain with palpation of thumb radial and ulnar collateral ligaments.      Assessment:  Patient presents with symptoms consistent with diagnosis of left thumb CMC arthritis, with conservative intervention.    Patient's limitations or Problem List includes:  Pain, Decreased ROM/motion, Decreased stability, Decreased  and Decreased pinch of the left thumb which interferes with the patient's ability to perform Recreational Activities and Household Chores as compared to previous level of function.    Rehab Potential:  Good - Return to full activity, some limitations    Patient will benefit from skilled Occupational Therapy to increase ROM,  strength, pinch  strength and stability of thumb and decrease pain to return to previous activity level and resume normal daily tasks and to reach their rehab potential.    Barriers to Learning:  No barrier    Communication Issues:  Patient appears to be able to clearly communicate and understand verbal and written communication and follow directions correctly.    Chart Review: Chart Review    Identified Performance Deficits: home establishment and management, meal preparation and cleanup and leisure activities    Assessment of Occupational Performance:  1-3 Performance Deficits    Clinical Decision Making (Complexity): Low complexity    Treatment Explanation:  The following has been discussed with the patient:    RX ordered/plan of care  Anticipated outcomes  Possible risks and side effects    Plan:  Frequency:  2 X a month, once daily  Duration:  for 2 months    Treatment Plan:    Modalities:    Paraffin   Therapeutic Exercise:    AROM, Isotonics, Isometrics and Stabilization  Therapeutic Activities:   Functional activities   Neuromuscular re-ed:   Kinesiotaping  Manual Techniques:   Joint mobilization and Myofascial release  Orthotic Fabrication:    Static orthosis  Self Care:    Adaptive Equipment Education and Joint Protection Techniques    Discharge Plan:  Achieve all LTG  Hall in home treatment program.  Reach maximal therapeutic benefit.    Home Program:  Isometric C and 1st dorsal interosseous AROM  Hand-based thumb spica splint, wear at night and during painful activities    Next Visit:  Progress thumb stability program  Thumb CMC joint self mobilization

## 2020-12-17 PROBLEM — G89.29 CHRONIC THUMB PAIN, LEFT: Status: ACTIVE | Noted: 2020-12-17

## 2020-12-17 PROBLEM — M79.645 CHRONIC THUMB PAIN, LEFT: Status: ACTIVE | Noted: 2020-12-17

## 2020-12-17 LAB
SARS-COV-2 RNA SPEC QL NAA+PROBE: NOT DETECTED
SPECIMEN SOURCE: NORMAL

## 2020-12-18 ENCOUNTER — HOSPITAL ENCOUNTER (OUTPATIENT)
Facility: AMBULATORY SURGERY CENTER | Age: 60
Discharge: HOME | End: 2020-12-18
Attending: INTERNAL MEDICINE | Admitting: INTERNAL MEDICINE
Payer: COMMERCIAL

## 2020-12-18 VITALS
WEIGHT: 170 LBS | HEIGHT: 74 IN | TEMPERATURE: 96.9 F | SYSTOLIC BLOOD PRESSURE: 141 MMHG | OXYGEN SATURATION: 99 % | RESPIRATION RATE: 16 BRPM | HEART RATE: 67 BPM | BODY MASS INDEX: 21.82 KG/M2 | DIASTOLIC BLOOD PRESSURE: 87 MMHG

## 2020-12-18 LAB — COLONOSCOPY: NORMAL

## 2020-12-18 PROCEDURE — 88305 TISSUE EXAM BY PATHOLOGIST: CPT | Performed by: PATHOLOGY

## 2020-12-18 PROCEDURE — 45380 COLONOSCOPY AND BIOPSY: CPT | Mod: 33

## 2020-12-18 RX ORDER — PROCHLORPERAZINE MALEATE 10 MG
10 TABLET ORAL EVERY 6 HOURS PRN
Status: DISCONTINUED | OUTPATIENT
Start: 2020-12-18 | End: 2020-12-19 | Stop reason: HOSPADM

## 2020-12-18 RX ORDER — NALOXONE HYDROCHLORIDE 0.4 MG/ML
0.4 INJECTION, SOLUTION INTRAMUSCULAR; INTRAVENOUS; SUBCUTANEOUS
Status: DISCONTINUED | OUTPATIENT
Start: 2020-12-18 | End: 2020-12-19 | Stop reason: HOSPADM

## 2020-12-18 RX ORDER — FENTANYL CITRATE 50 UG/ML
INJECTION, SOLUTION INTRAMUSCULAR; INTRAVENOUS PRN
Status: DISCONTINUED | OUTPATIENT
Start: 2020-12-18 | End: 2020-12-18 | Stop reason: HOSPADM

## 2020-12-18 RX ORDER — NALOXONE HYDROCHLORIDE 0.4 MG/ML
0.2 INJECTION, SOLUTION INTRAMUSCULAR; INTRAVENOUS; SUBCUTANEOUS
Status: DISCONTINUED | OUTPATIENT
Start: 2020-12-18 | End: 2020-12-19 | Stop reason: HOSPADM

## 2020-12-18 RX ORDER — SIMETHICONE
LIQUID (ML) MISCELLANEOUS PRN
Status: DISCONTINUED | OUTPATIENT
Start: 2020-12-18 | End: 2020-12-18 | Stop reason: HOSPADM

## 2020-12-18 RX ORDER — ONDANSETRON 2 MG/ML
4 INJECTION INTRAMUSCULAR; INTRAVENOUS EVERY 6 HOURS PRN
Status: DISCONTINUED | OUTPATIENT
Start: 2020-12-18 | End: 2020-12-19 | Stop reason: HOSPADM

## 2020-12-18 RX ORDER — FLUMAZENIL 0.1 MG/ML
0.2 INJECTION, SOLUTION INTRAVENOUS
Status: DISCONTINUED | OUTPATIENT
Start: 2020-12-18 | End: 2020-12-19 | Stop reason: HOSPADM

## 2020-12-18 RX ORDER — ONDANSETRON 2 MG/ML
4 INJECTION INTRAMUSCULAR; INTRAVENOUS
Status: DISCONTINUED | OUTPATIENT
Start: 2020-12-18 | End: 2020-12-18 | Stop reason: HOSPADM

## 2020-12-18 RX ORDER — ONDANSETRON 4 MG/1
4 TABLET, ORALLY DISINTEGRATING ORAL EVERY 6 HOURS PRN
Status: DISCONTINUED | OUTPATIENT
Start: 2020-12-18 | End: 2020-12-19 | Stop reason: HOSPADM

## 2020-12-18 RX ORDER — LIDOCAINE 40 MG/G
CREAM TOPICAL
Status: DISCONTINUED | OUTPATIENT
Start: 2020-12-18 | End: 2020-12-18 | Stop reason: HOSPADM

## 2020-12-18 ASSESSMENT — MIFFLIN-ST. JEOR: SCORE: 1640.86

## 2020-12-18 NOTE — DISCHARGE INSTRUCTIONS
Discharge Instructions after Colonoscopy  or Sigmoidoscopy    Today you had a ___x_ Colonoscopy ____ Sigmoidoscopy    Activity and Diet  You were given medicine for pain. You may be dizzy or sleepy.  For 24 hours:    Do not drive or use heavy equipment.    Do not make important decisions.    Do not drink any alcohol.  You may return to your normal diet and medicines.    Discomfort    Air was placed in your colon during the exam in order to see it. Walking helps to pass the air.    You may take Tylenol (acetaminophen) for pain unless your doctor has told you not to.  Do not take aspirin or ibuprofen (Advil, Motrin, or other anti-inflammatory  drugs) for __3___ days.    Follow-up  ____ We took small tissue samples or polyps to study. Your doctor will call you with the results  within two weeks.    When to call:    Call right away if you have:    Unusual pain in belly or chest pain not relieved with passing air.    More than 1 to 2 Tablespoons of bleeding from your rectum.    Fever above 100.6  F (37.5  C).    If you have severe pain, bleeding, or shortness of breath, go to an emergency room.    If you have questions, call:  Monday to Friday, 7 a.m. to 4:30 p.m.  Endoscopy: [phone] (We may have to call you back)    After hours  Hospital: [phone] (Ask for the GI fellow on call)

## 2020-12-21 LAB — COPATH REPORT: NORMAL

## 2021-06-01 PROBLEM — M79.645 CHRONIC THUMB PAIN, LEFT: Status: RESOLVED | Noted: 2020-12-17 | Resolved: 2021-06-01

## 2021-06-01 PROBLEM — G89.29 CHRONIC THUMB PAIN, LEFT: Status: RESOLVED | Noted: 2020-12-17 | Resolved: 2021-06-01

## 2021-09-19 ENCOUNTER — HEALTH MAINTENANCE LETTER (OUTPATIENT)
Age: 61
End: 2021-09-19

## 2021-10-13 ENCOUNTER — OFFICE VISIT (OUTPATIENT)
Dept: INTERNAL MEDICINE | Facility: CLINIC | Age: 61
End: 2021-10-13
Payer: COMMERCIAL

## 2021-10-13 ENCOUNTER — LAB (OUTPATIENT)
Dept: LAB | Facility: CLINIC | Age: 61
End: 2021-10-13
Payer: COMMERCIAL

## 2021-10-13 VITALS
DIASTOLIC BLOOD PRESSURE: 86 MMHG | RESPIRATION RATE: 16 BRPM | WEIGHT: 162 LBS | HEIGHT: 75 IN | OXYGEN SATURATION: 99 % | HEART RATE: 62 BPM | BODY MASS INDEX: 20.14 KG/M2 | SYSTOLIC BLOOD PRESSURE: 123 MMHG

## 2021-10-13 DIAGNOSIS — E78.5 HYPERLIPIDEMIA LDL GOAL <100: ICD-10-CM

## 2021-10-13 DIAGNOSIS — Z80.42 FAMILY HX OF PROSTATE CANCER: ICD-10-CM

## 2021-10-13 DIAGNOSIS — Z13.29 SCREENING FOR HYPOTHYROIDISM: ICD-10-CM

## 2021-10-13 DIAGNOSIS — Z80.42 FAMILY HX OF PROSTATE CANCER: Primary | ICD-10-CM

## 2021-10-13 LAB
ALBUMIN SERPL-MCNC: 3.8 G/DL (ref 3.4–5)
ALP SERPL-CCNC: 91 U/L (ref 40–150)
ALT SERPL W P-5'-P-CCNC: 19 U/L (ref 0–70)
ANION GAP SERPL CALCULATED.3IONS-SCNC: 5 MMOL/L (ref 3–14)
AST SERPL W P-5'-P-CCNC: 12 U/L (ref 0–45)
BILIRUB SERPL-MCNC: 1.8 MG/DL (ref 0.2–1.3)
BUN SERPL-MCNC: 14 MG/DL (ref 7–30)
CALCIUM SERPL-MCNC: 9.1 MG/DL (ref 8.5–10.1)
CHLORIDE BLD-SCNC: 103 MMOL/L (ref 94–109)
CHOLEST SERPL-MCNC: 172 MG/DL
CO2 SERPL-SCNC: 30 MMOL/L (ref 20–32)
CREAT SERPL-MCNC: 1.02 MG/DL (ref 0.66–1.25)
FASTING STATUS PATIENT QL REPORTED: YES
GFR SERPL CREATININE-BSD FRML MDRD: 78 ML/MIN/1.73M2
GLUCOSE BLD-MCNC: 92 MG/DL (ref 70–99)
HDLC SERPL-MCNC: 58 MG/DL
LDLC SERPL CALC-MCNC: 100 MG/DL
NONHDLC SERPL-MCNC: 114 MG/DL
POTASSIUM BLD-SCNC: 3.8 MMOL/L (ref 3.4–5.3)
PROT SERPL-MCNC: 8 G/DL (ref 6.8–8.8)
PSA SERPL-MCNC: 1.36 UG/L (ref 0–4)
SODIUM SERPL-SCNC: 138 MMOL/L (ref 133–144)
TRIGL SERPL-MCNC: 72 MG/DL
TSH SERPL DL<=0.005 MIU/L-ACNC: 2.3 MU/L (ref 0.4–4)

## 2021-10-13 PROCEDURE — 80053 COMPREHEN METABOLIC PANEL: CPT | Performed by: PATHOLOGY

## 2021-10-13 PROCEDURE — 80061 LIPID PANEL: CPT | Performed by: PATHOLOGY

## 2021-10-13 PROCEDURE — 99396 PREV VISIT EST AGE 40-64: CPT | Performed by: INTERNAL MEDICINE

## 2021-10-13 PROCEDURE — 84443 ASSAY THYROID STIM HORMONE: CPT | Performed by: PATHOLOGY

## 2021-10-13 PROCEDURE — 36415 COLL VENOUS BLD VENIPUNCTURE: CPT | Performed by: PATHOLOGY

## 2021-10-13 PROCEDURE — G0103 PSA SCREENING: HCPCS | Performed by: PATHOLOGY

## 2021-10-13 ASSESSMENT — MIFFLIN-ST. JEOR: SCORE: 1615.46

## 2021-10-13 ASSESSMENT — PAIN SCALES - GENERAL: PAINLEVEL: NO PAIN (0)

## 2021-10-13 NOTE — NURSING NOTE
Chano Vaz is a 61 year old male patient that presents today in clinic for the following:    Chief Complaint   Patient presents with     Physical     Patient comes in for an annual physical for his job.      The patient's allergies and medications were reviewed as noted. A set of vitals were recorded as noted without incident. The patient does not have any other questions for the provider.    Dudley Vazquez, EMT at 3:06 PM on 10/13/2021

## 2021-10-14 ENCOUNTER — MYC MEDICAL ADVICE (OUTPATIENT)
Dept: INTERNAL MEDICINE | Facility: CLINIC | Age: 61
End: 2021-10-14

## 2021-10-14 NOTE — PROGRESS NOTES
"Chano is a very pleasant 61 year old male who was seen today for physical.  He feels well today and has no medical concerns.  He is a bit stressed by his daughter's mental health situation and how long it took her to be seen and evaluated for depression, but otherwise has felt well since last year.    He is interested in obtaining screening labs again and completing a form for wellness points for his work.    No changes to past, family or social history and  ROS: 10 point ROS neg other than the symptoms noted above in the HPI.    PHYSICAL EXAM:  /86 (BP Location: Right arm, Patient Position: Sitting, Cuff Size: Adult Regular)   Pulse 62   Resp 16   Ht 1.905 m (6' 3\")   Wt 73.5 kg (162 lb)   SpO2 99%   BMI 20.25 kg/m    Constitutional: no distress, comfortable, pleasant   Eyes: anicteric, normal extra-ocular movements   Ears, Nose and Throat: tympanic membranes clear, nose clear and free of lesions, throat clear, neck supple with full range of motion, no thyromegaly.   Cardiovascular: regular rate and rhythm, normal S1 and S2, no murmurs, rubs or gallops, peripheral pulses full and symmetric   Respiratory: clear to auscultation, no wheezes or crackles, normal breath sounds   Gastrointestinal: positive bowel sounds, nontender, no hepatosplenomegaly, no masses   Musculoskeletal: knees full range of motion, no active joint  Skin: no concerning lesions, no jaundice   Neurological: normal gait, no tremor   Psychological: appropriate mood   Lymphatic: no cervical lymphadenopathy and no pedal edema    A/P 61 year old male here for PE, doing well    Family hx of prostate cancer  -     PSA screen; Future    Hyperlipidemia LDL goal <100  -     Lipid Profile FASTING; Future  -     Comprehensive metabolic panel; Future    Screening for hypothyroidism  -     TSH with free T4 reflex; Future    RTC one year or sooner prn    Cinthia Akbar MD    "

## 2021-10-18 ENCOUNTER — MYC MEDICAL ADVICE (OUTPATIENT)
Dept: INTERNAL MEDICINE | Facility: CLINIC | Age: 61
End: 2021-10-18

## 2021-10-19 NOTE — CONFIDENTIAL NOTE
Good morning Cayetano,    Your Provider Screening Form was Faxed to [phone] and  The original was mailed to your home address on  10-18.20.  Have a great rest of your day.    Leti

## 2021-10-20 ENCOUNTER — TELEPHONE (OUTPATIENT)
Dept: INTERNAL MEDICINE | Facility: CLINIC | Age: 61
End: 2021-10-20

## 2021-10-20 NOTE — TELEPHONE ENCOUNTER
A copy of Provider Screening Form was mailed to patient.  [address] [facility] [address]    Tayo Vera CMA ([facility]) at 4:46 PM on 10/20/2021

## 2021-10-20 NOTE — TELEPHONE ENCOUNTER
[facility] Call Center    Phone Message    May a detailed message be left on voicemail: yes     Reason for Call: Form or Letter   Type or form/letter needing completion: Provider Screening  Provider: Cinthia Robin MD  Date form needed: Friday 10/22/2021  Once completed: [email or web address] message completed form to patient      Action Taken: Message routed to:  [facility]    Travel Screening: Not Applicable

## 2021-10-21 ENCOUNTER — TELEPHONE (OUTPATIENT)
Dept: INTERNAL MEDICINE | Facility: CLINIC | Age: 61
End: 2021-10-21

## 2021-10-21 NOTE — TELEPHONE ENCOUNTER
[facility] Call Center    Phone Message    May a detailed message be left on voicemail: yes     Reason for Call: Form or Letter   Type or form/letter needing completion: Provider Screening  Provider: Cinthia Robin MD  Date form needed: today  Once completed: Patient will  at [facility] .    Patient calling and said he will be coming in today 10/21/2021 to the Primary Care clinic to  the provider screening form. Patient will be going out of town and needs the paperwork today.      Action Taken: Message routed to:  [facility]    Travel Screening: Not Applicable

## 2021-10-21 NOTE — TELEPHONE ENCOUNTER
Printed form of and put in envelope. Placed on 1st floor lock box. Amelia Ashby LPN 10/21/2021 12:20 PM

## 2021-10-21 NOTE — TELEPHONE ENCOUNTER
Duplicate. Form was faxed yesterday,10/20/21 at 4:45pm. [employer] message was sent to the patient. Amelia Ashby LPN 10/21/2021 9:10 AM

## 2022-11-20 ENCOUNTER — HEALTH MAINTENANCE LETTER (OUTPATIENT)
Age: 62
End: 2022-11-20

## 2023-11-25 ENCOUNTER — HEALTH MAINTENANCE LETTER (OUTPATIENT)
Age: 63
End: 2023-11-25

## (undated) DEVICE — KIT ENDO TURNOVER/PROCEDURE CARRY-ON 101822

## (undated) DEVICE — SPECIMEN CONTAINER 3OZ W/FORMALIN 59901

## (undated) DEVICE — SUCTION MANIFOLD NEPTUNE 2 SYS 1 PORT 702-025-000

## (undated) DEVICE — TUBING SUCTION 12"X1/4" N612

## (undated) DEVICE — ENDO FORCEP SPIKED SERRATED SHAFT JUMBO 239CM G56998

## (undated) DEVICE — KIT ENDO FIRST STEP DISINFECTANT 200ML W/POUCH EP-4

## (undated) DEVICE — GOWN IMPERVIOUS 2XL BLUE

## (undated) DEVICE — SOL WATER IRRIG 1000ML BOTTLE 2F7114

## (undated) RX ORDER — FENTANYL CITRATE 50 UG/ML
INJECTION, SOLUTION INTRAMUSCULAR; INTRAVENOUS
Status: DISPENSED
Start: 2020-12-18